# Patient Record
Sex: FEMALE | Race: WHITE | NOT HISPANIC OR LATINO | Employment: FULL TIME | URBAN - METROPOLITAN AREA
[De-identification: names, ages, dates, MRNs, and addresses within clinical notes are randomized per-mention and may not be internally consistent; named-entity substitution may affect disease eponyms.]

---

## 2017-07-17 PROBLEM — Z86.718 HISTORY OF DVT OF LOWER EXTREMITY: Status: ACTIVE | Noted: 2017-07-17

## 2017-07-25 ENCOUNTER — TELEPHONE (OUTPATIENT)
Dept: HEMATOLOGY/ONCOLOGY | Facility: CLINIC | Age: 22
End: 2017-07-25

## 2017-07-25 NOTE — TELEPHONE ENCOUNTER
----- Message from Jossy Denny sent at 7/25/2017  2:46 PM CDT -----  Schedule from the referral.  Please call patient at [phone]

## 2017-07-31 ENCOUNTER — TELEPHONE (OUTPATIENT)
Dept: HEMATOLOGY/ONCOLOGY | Facility: CLINIC | Age: 22
End: 2017-07-31

## 2017-08-09 ENCOUNTER — TELEPHONE (OUTPATIENT)
Dept: HEMATOLOGY/ONCOLOGY | Facility: CLINIC | Age: 22
End: 2017-08-09

## 2017-08-09 NOTE — TELEPHONE ENCOUNTER
Attempted to call patient to schedule consult no answer, unable to leave message due to no voice mail.

## 2017-08-15 ENCOUNTER — TELEPHONE (OUTPATIENT)
Dept: HEMATOLOGY/ONCOLOGY | Facility: CLINIC | Age: 22
End: 2017-08-15

## 2017-08-22 ENCOUNTER — TELEPHONE (OUTPATIENT)
Dept: HEMATOLOGY/ONCOLOGY | Facility: CLINIC | Age: 22
End: 2017-08-22

## 2017-08-22 NOTE — TELEPHONE ENCOUNTER
----- Message from Koby Schrader sent at 8/22/2017 12:27 PM CDT -----  Contact: [phone]  Patient returning the nurse phone call to schedule an appointment. Thanks!

## 2017-09-07 ENCOUNTER — TELEPHONE (OUTPATIENT)
Dept: HEMATOLOGY/ONCOLOGY | Facility: CLINIC | Age: 22
End: 2017-09-07

## 2017-09-11 ENCOUNTER — TELEPHONE (OUTPATIENT)
Dept: HEMATOLOGY/ONCOLOGY | Facility: CLINIC | Age: 22
End: 2017-09-11

## 2017-10-09 ENCOUNTER — OFFICE VISIT (OUTPATIENT)
Dept: HEMATOLOGY/ONCOLOGY | Facility: CLINIC | Age: 22
End: 2017-10-09
Payer: MEDICAID

## 2017-10-09 VITALS
HEIGHT: 64 IN | HEART RATE: 80 BPM | WEIGHT: 225.63 LBS | RESPIRATION RATE: 18 BRPM | DIASTOLIC BLOOD PRESSURE: 80 MMHG | TEMPERATURE: 99 F | SYSTOLIC BLOOD PRESSURE: 133 MMHG | BODY MASS INDEX: 38.52 KG/M2

## 2017-10-09 DIAGNOSIS — N83.202 BILATERAL OVARIAN CYSTS: ICD-10-CM

## 2017-10-09 DIAGNOSIS — Z82.49 FAMILY HISTORY OF BLOOD CLOTS: ICD-10-CM

## 2017-10-09 DIAGNOSIS — N83.201 BILATERAL OVARIAN CYSTS: ICD-10-CM

## 2017-10-09 DIAGNOSIS — I82.531 CHRONIC DEEP VEIN THROMBOSIS (DVT) OF POPLITEAL VEIN OF RIGHT LOWER EXTREMITY: ICD-10-CM

## 2017-10-09 DIAGNOSIS — Z86.718 HISTORY OF DVT OF LOWER EXTREMITY: Primary | ICD-10-CM

## 2017-10-09 DIAGNOSIS — Q50.1 MULTIPLE DEVELOPMENTAL OVARIAN CYSTS: ICD-10-CM

## 2017-10-09 PROCEDURE — 99204 OFFICE O/P NEW MOD 45 MIN: CPT | Mod: ,,, | Performed by: INTERNAL MEDICINE

## 2017-10-09 RX ORDER — MEDROXYPROGESTERONE ACETATE 150 MG/ML
INJECTION, SUSPENSION INTRAMUSCULAR
COMMUNITY
Start: 2017-09-13 | End: 2020-05-27

## 2017-10-09 NOTE — LETTER
October 9, 2017      Beba Austin MD  [address]  [place] [place] [facility] [address]           [facility] Hematology Oncology  [address]  [address]  [place] [address]  Phone: [phone]  Fax: [phone]          Patient: Georgie Dunlap   MR Number: [id number]   YOB: 1995   Date of Visit: 10/9/2017       Dear Dr. Beba Austin:    Thank you for referring Georgie Dunlap to me for evaluation. Attached you will find relevant portions of my assessment and plan of care.    If you have questions, please do not hesitate to call me. I look forward to following Georgie Dunlap along with you.    Sincerely,    CHAR Pierre MD    Enclosure  CC:  No Recipients    If you would like to receive this communication electronically, please contact [email or web address] or [phone] to request more information on "[employer] Link access.    For providers and/or their staff who would like to refer a patient to [facility], please contact us through our one-stop-shop provider referral line, [facility] , at [phone].    If you feel you have received this communication in error or would no longer like to receive these types of communications, please e-mail [email or web address]

## 2017-10-09 NOTE — PROGRESS NOTES
[facility] History & Physical    Subjective:      Patient ID:   Georgie Dunlap  22 y.o. female   [birth year]  Dr. Lester (GYN)          Chief Complaint:   DVT history    HPI:  22 y.o. female with gel and hit her right leg with injury. She developed a right popliteal vein DVT approximately 3 years ago. She was treated with Eliquis ×6 months, and a follow-up ultrasound of the leg at [facility] showed resolution of the DVT.    She has multiple cyst on her ovaries causing her to have pelvic pain. Menses are irregular and heavy. She was given a Depot-progesterone shot on 09/13/2017. Pain symptoms have improved from an 8 out of 10 down to a 3 out of 10.    Other history includes asthma, fibromyalgia, sleep apnea syndrome treated with tonsillectomy at age 18.    She also has history of GERD. 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    She is allergic to sulfa drugs, latex, and plums.    Past history includes the tonsillectomy    Her medications include metformin, the Depo progesterone shot, Zyrtec, Xopenex,  Flovent and Flonase.    Her maternal grandmother and maternal grandfather both had blood clots in the legs. Her mother had kidney failure with each of HER-2 pregnancies and is status post BTL. Her sister has femoral acetabular impingement syndrome of the left and right hip. The patient has not had pregnancies, she does not have history of miscarriages, and she does not have children.    Previous evaluation showed that she was positive for factor V Leiden.    ROS:   GEN: normal without any fever, night sweats or weight loss  HEENT: normal with no HA's, sore throat, stiff neck, changes in vision  CV: normal with no CP, SOB, PND, MARTINEZ or orthopnea  PULM: See HPI. no SOB, cough, hemoptysis, sputum or pleuritic pain  GI: normal with no abdominal pain, nausea, vomiting, constipation, diarrhea,  melanotic stools, BRBPR, or hematemesis  /GYN: See history of present illness   BREAST: normal with no mass, discharge, pain  SKIN: See history of present illness     Past Medical History:   Diagnosis Date    Allergic rhinitis     Asthma     Fibromyalgia     GERD (gastroesophageal reflux disease)      Past Surgical History:   Procedure Laterality Date    TONSILLECTOMY  07/2014       Review of patient's allergies indicates:   Allergen Reactions    Latex, natural rubber     Plum     Sulfa (sulfonamide antibiotics)      Social History     Social History    Marital status: Unknown     Spouse name: N/A    Number of children: N/A    Years of education: N/A     Occupational History    Not on file.     Social History Main Topics    Smoking status: Never Smoker    Smokeless tobacco: Never Used    Alcohol use No    Drug use: No    Sexual activity: Not on file     Other Topics Concern    Not on file     Social History Narrative    No narrative on file         Current Outpatient Prescriptions:     cetirizine (ZYRTEC) 10 MG tablet, Take 10 mg by mouth once daily., Disp: , Rfl:     fluticasone (FLONASE) 50 mcg/actuation nasal spray, 1 spray by Each Nare route once daily., Disp: 16 g, Rfl: 0    fluticasone (FLOVENT HFA) 110 mcg/actuation inhaler, Inhale 1 puff into the lungs 2 (two) times daily. Controller, Disp: 12 g, Rfl: 0    guaifenesin-codeine 100-10 mg/5 ml (TUSSI-ORGANIDIN NR)  mg/5 mL syrup, Take 5 mLs by mouth nightly as needed., Disp: 120 mL, Rfl: 0    ipratropium (ATROVENT) 0.02 % nebulizer solution, Take 2.5 mLs (500 mcg total) by nebulization 4 (four) times daily. Rescue, Disp: 25 mL, Rfl: 0    levalbuterol (XOPENEX HFA) 45 mcg/actuation inhaler, Inhale 1-2 puffs into the lungs every 4 (four) hours as needed for Wheezing. Rescue, Disp: 15 g, Rfl: 2    medroxyPROGESTERone (DEPO-PROVERA) 150 mg/mL injection, , Disp: , Rfl:     metformin (GLUCOPHAGE-XR) 500 MG 24 hr tablet, Take 1  "tablet (500 mg total) by mouth daily with breakfast., Disp: 90 tablet, Rfl: 0    guaifenesin (MUCINEX) 600 mg 12 hr tablet, Take 1 tablet (600 mg total) by mouth 2 (two) times daily., Disp: 60 tablet, Rfl: 2          Objective:   Vitals:  Blood pressure 133/80, pulse 80, temperature 98.6 °F (37 °C), resp. rate 18, height 5' 4" (1.626 m), weight 102.3 kg (225 lb 9.6 oz).       GEN: no apparent distress, comfortable  HEAD: atraumatic and normocephalic  EYES: no pallor, no icterus  ENT: no pharyngeal erythema, external ears WNL; no nasal discharge  NECK: no masses, thyroid normal, trachea midline, no LAD/LN's, supple  CV: RRR with no murmur; normal pulse; normal S1 and S2  CHEST: Normal respiratory effort; CTAB; normal breath sounds; no wheeze or crackles  ABDOM: nontender and nondistended; soft; normal bowel sounds; no rebound/guarding, liver and spleen are not palpable   MUSC/Skeletal: ROM normal; no crepitus; joints normal  EXTREM: Calves are nontender at the left and right, without edema   SKIN: No petechiae, no purpura, no ecchymoses, no rash   : no spivey  NEURO: grossly intact; motor/sensory WNL; no tremors  PSYCH: normal mood, affect and behavior  LYMPH: normal cervical, supraclavicular, axillary and groin LN's  She left the bra all, I did not examine her breasts    Labs:   Lab Results   Component Value Date    WBC 13.1 (H) 07/10/2015    HGB 13.3 07/10/2015    HCT 41.5 07/10/2015    MCV 83.5 07/10/2015     07/10/2015    CMP  Sodium   Date Value Ref Range Status   07/17/2017 139 136 - 145 mmol/L Final   07/10/2015 140 137 - 145 MMOL/L Final     Potassium   Date Value Ref Range Status   07/17/2017 4.3 3.5 - 5.1 mmol/L Final   07/10/2015 4.1 3.5 - 5.1 MMOL/L Final     Chloride   Date Value Ref Range Status   07/17/2017 103 95 - 110 mmol/L Final   07/10/2015 102 98 - 107 MMOL/L Final     CO2   Date Value Ref Range Status   07/17/2017 27 22 - 31 mmol/L Final     Glucose   Date Value Ref Range Status "   07/17/2017 82 70 - 110 mg/dL Final     Comment:     The ADA recommends the following guidelines for fasting glucose:  Normal:       less than 100 mg/dL  Prediabetes:  100 mg/dL to 125 mg/dL  Diabetes:     126 mg/dL or higher       BUN, Bld   Date Value Ref Range Status   07/17/2017 15 7 - 18 mg/dL Final     Creatinine   Date Value Ref Range Status   07/17/2017 0.83 0.50 - 1.40 mg/dL Final   07/10/2015 0.90 0.52 - 1.04 MG/DL Final     Calcium   Date Value Ref Range Status   07/17/2017 9.4 8.4 - 10.2 mg/dL Final     Total Protein   Date Value Ref Range Status   07/17/2017 7.5 6.0 - 8.4 g/dL Final     Albumin   Date Value Ref Range Status   07/17/2017 4.3 3.5 - 5.2 g/dL Final   07/10/2015 4.2 3.5 - 5.0 G/DL Final     Total Bilirubin   Date Value Ref Range Status   07/17/2017 0.4 0.2 - 1.3 mg/dL Final     Comment:     For infants and newborns, interpretation of results should be based  on gestational age, weight and in agreement with clinical  observations.  Premature Infant recommended reference ranges:  Up to 24 hours.............<8.0 mg/dL  Up to 48 hours............<12.0 mg/dL  3-5 days..................<15.0 mg/dL  6-29 days.................<15.0 mg/dL       Alkaline Phosphatase   Date Value Ref Range Status   07/17/2017 77 38 - 145 U/L Final     AST   Date Value Ref Range Status   07/17/2017 25 14 - 36 U/L Final     ALT   Date Value Ref Range Status   07/17/2017 40 10 - 44 U/L Final     Anion Gap   Date Value Ref Range Status   07/17/2017 9 8 - 16 mmol/L Final     eGFR if    Date Value Ref Range Status   07/17/2017 >60 >60 mL/min/1.73 m^2 Final     eGFR if non    Date Value Ref Range Status   07/17/2017 >60 >60 mL/min/1.73 m^2 Final     Comment:     Calculation used to obtain the estimated glomerular filtration  rate (eGFR) is the CKD-EPI equation. Since race is unknown   in our information system, the eGFR values for   -American and Non--American patients are given    for each creatinine result.           Radiology/Diagnostic Studies:    [facility].    Ultrasound from February 2016 was negative for residual DVT.    Assessment:   (1) 22 y.o. female with diagnosis of Left popliteal vein DVT, while on hormone supplement, for the management of multiple ovarian cyst and pain syndrome.    Now on Depo- progesterone injections 09/13/2017. This raises concern for possible future clot events in the setting of her factor V Leiden carrier state.    She has strong family history and her maternal grandmother and grandfather for blood clot events, supporting a familial pattern for blood clots.    I have ordered hypercoagulation syndrome studies through the [facility]. Return to clinic in 3 weeks.    Other history includes multiple ovarian cyst.  Asthma. History of sleep apnea syndrome.      (2)          1. History of DVT of lower extremity    2. Chronic deep vein thrombosis (DVT) of popliteal vein of right lower extremity    3. Bilateral ovarian cysts    4. Family history of blood clots        Plan:       History of DVT of lower extremity    Chronic deep vein thrombosis (DVT) of popliteal vein of right lower extremity    Bilateral ovarian cysts    Family history of blood clots      Return in about 3 weeks (around 10/30/2017) for check blood clot status after therapy..

## 2017-10-11 LAB
LA PPP-IMP: NORMAL
SCREEN APTT: NORMAL S
SCREEN DRVVT: NORMAL S

## 2017-10-14 LAB
APTT PPP: 27 SEC (ref 22–34)
AT III ACT/NOR PPP CHRO: 112 % NORMAL (ref 80–120)
B2 MICROGLOB SERPL-MCNC: 1.86 MG/L
BASOPHILS # BLD AUTO: 26 CELLS/UL (ref 0–200)
BASOPHILS NFR BLD AUTO: 0.3 %
CARDIOLIPIN IGA SER IA-ACNC: <11 APL
CARDIOLIPIN IGG SER IA-ACNC: <14 GPL
CARDIOLIPIN IGM SER IA-ACNC: <12 MPL
DRVVT CONFIRM PPP QL: NEGATIVE
EOSINOPHIL # BLD AUTO: 70 CELLS/UL (ref 15–500)
EOSINOPHIL NFR BLD AUTO: 0.8 %
ERYTHROCYTE [DISTWIDTH] IN BLOOD BY AUTOMATED COUNT: 13.6 % (ref 11–15)
F2 C.20210G>A GENO BLD/T: NORMAL
FACT VIII ACT/NOR PPP: 107 % NORMAL (ref 50–180)
FACT XII ACT/NOR PPP: 126 % NORMAL (ref 50–150)
FIBRINOGEN PPP-MCNC: 357 MG/DL (ref 175–425)
HCT VFR BLD AUTO: 40.8 % (ref 35–45)
HCYS SERPL-SCNC: 8.4 UMOL/L
HGB BLD-MCNC: 13.3 G/DL (ref 11.7–15.5)
INR PPP: 1.1
LA PPP-IMP: NORMAL
LYMPHOCYTES # BLD AUTO: 3654 CELLS/UL (ref 850–3900)
LYMPHOCYTES NFR BLD AUTO: 42 %
MCH RBC QN AUTO: 26.7 PG (ref 27–33)
MCHC RBC AUTO-ENTMCNC: 32.6 G/DL (ref 32–36)
MCV RBC AUTO: 81.8 FL (ref 80–100)
MONOCYTES # BLD AUTO: 409 CELLS/UL (ref 200–950)
MONOCYTES NFR BLD AUTO: 4.7 %
MTHFR GENE MUT ANL BLD/T: NORMAL
NEUTROPHILS # BLD AUTO: 4541 CELLS/UL (ref 1500–7800)
NEUTROPHILS NFR BLD AUTO: 52.2 %
PLATELET # BLD AUTO: 337 THOUSAND/UL (ref 140–400)
PMV BLD REES-ECKER: 10 FL (ref 7.5–12.5)
PROT C ACT/NOR PPP: 105 % NORMAL (ref 70–180)
PROT C AG ACT/NOR PPP IA: NORMAL % NORMAL
PROT S ACT/NOR PPP: 84 % NORMAL (ref 60–140)
PROT S AG ACT/NOR PPP IA: NORMAL % NORMAL
PROTHROMBIN TIME: 11.2 SEC (ref 9–11.5)
RBC # BLD AUTO: 4.99 MILLION/UL (ref 3.8–5.1)
SCREEN APTT: 32 SECONDS
SCREEN DRVVT: 46 SECONDS
SERVICE CMNT-IMP: NORMAL
WBC # BLD AUTO: 8.7 THOUSAND/UL (ref 3.8–10.8)

## 2017-10-31 ENCOUNTER — OFFICE VISIT (OUTPATIENT)
Dept: HEMATOLOGY/ONCOLOGY | Facility: CLINIC | Age: 22
End: 2017-10-31
Payer: MEDICAID

## 2017-10-31 VITALS
HEART RATE: 86 BPM | WEIGHT: 229.38 LBS | HEIGHT: 64 IN | SYSTOLIC BLOOD PRESSURE: 120 MMHG | TEMPERATURE: 98 F | DIASTOLIC BLOOD PRESSURE: 79 MMHG | BODY MASS INDEX: 39.16 KG/M2 | RESPIRATION RATE: 18 BRPM

## 2017-10-31 DIAGNOSIS — J30.9 ALLERGIC RHINITIS, UNSPECIFIED CHRONICITY, UNSPECIFIED SEASONALITY, UNSPECIFIED TRIGGER: ICD-10-CM

## 2017-10-31 DIAGNOSIS — J45.909 UNCOMPLICATED ASTHMA, UNSPECIFIED ASTHMA SEVERITY, UNSPECIFIED WHETHER PERSISTENT: ICD-10-CM

## 2017-10-31 DIAGNOSIS — Q50.1 MULTIPLE DEVELOPMENTAL OVARIAN CYSTS: ICD-10-CM

## 2017-10-31 DIAGNOSIS — K21.9 GASTROESOPHAGEAL REFLUX DISEASE, ESOPHAGITIS PRESENCE NOT SPECIFIED: ICD-10-CM

## 2017-10-31 DIAGNOSIS — D68.51 FACTOR 5 LEIDEN MUTATION, HETEROZYGOUS: ICD-10-CM

## 2017-10-31 DIAGNOSIS — I82.531 CHRONIC DEEP VEIN THROMBOSIS (DVT) OF POPLITEAL VEIN OF RIGHT LOWER EXTREMITY: Primary | ICD-10-CM

## 2017-10-31 PROCEDURE — 99214 OFFICE O/P EST MOD 30 MIN: CPT | Mod: ,,, | Performed by: INTERNAL MEDICINE

## 2017-10-31 NOTE — PROGRESS NOTES
[facility] History & Physical    Subjective:      Patient ID:   Georgie Dunlap  22 y.o. female   [birth year]  Dr. Lester (GYN)          Chief Complaint:   DVT history    HPI:  22 y.o. female who fell  and hit her right leg with injury. She developed a right popliteal vein DVT approximately 3 years ago. She was treated with Eliquis ×6 months, and a follow-up ultrasound of the leg at [facility] showed resolution of the DVT.    She has multiple cyst on her ovaries causing her to have pelvic pain. Menses are irregular and heavy. She was given a Depot-progesterone shot on 09/13/2017. Pain symptoms have improved from an 8 out of 10 down to a 3 out of 10.  She is diagnosed with Polycystic Ovarian Syndrome.    Other history includes asthma, fibromyalgia, sleep apnea syndrome treated with tonsillectomy at age 18.    She also has history of GERD. 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    She is allergic to sulfa drugs, latex, and plums.    Past history includes the tonsillectomy    Her medications include metformin, the Depo progesterone shot, Zyrtec, Xopenex,  Flovent and Flonase.    Her maternal grandmother and maternal grandfather both had blood clots in the legs. Her mother had kidney failure with each of HER-2 pregnancies and is status post BTL. Her sister has femoral acetabular impingement syndrome of the left and right hip. The patient has not had pregnancies, she does not have history of miscarriages, and she does not have children.    Previous evaluation showed that she was positive for factor V Leiden.  Completion of hypercoagulation studies were otherwise negative.    ROS:   GEN: normal without any fever, night sweats or weight loss  HEENT: normal with no HA's, sore throat, stiff neck, changes in vision  CV: normal with no CP, SOB, PND, MARTINEZ or orthopnea  PULM: See HPI. no SOB, cough,  hemoptysis, sputum or pleuritic pain  GI: normal with no abdominal pain, nausea, vomiting, constipation, diarrhea, melanotic stools, BRBPR, or hematemesis  /GYN: See history of present illness   BREAST: normal with no mass, discharge, pain  SKIN: See history of present illness     Past Medical History:   Diagnosis Date    Allergic rhinitis     Asthma     Fibromyalgia     GERD (gastroesophageal reflux disease)      Past Surgical History:   Procedure Laterality Date    TONSILLECTOMY  07/2014       Review of patient's allergies indicates:   Allergen Reactions    Latex, natural rubber     Plum     Sulfa (sulfonamide antibiotics)      Social History     Social History    Marital status: Unknown     Spouse name: N/A    Number of children: N/A    Years of education: N/A     Occupational History    Not on file.     Social History Main Topics    Smoking status: Never Smoker    Smokeless tobacco: Never Used    Alcohol use No    Drug use: No    Sexual activity: Not on file     Other Topics Concern    Not on file     Social History Narrative    No narrative on file         Current Outpatient Prescriptions:     cetirizine (ZYRTEC) 10 MG tablet, Take 10 mg by mouth once daily., Disp: , Rfl:     fluticasone (FLONASE) 50 mcg/actuation nasal spray, 1 spray by Each Nare route once daily., Disp: 16 g, Rfl: 0    fluticasone (FLOVENT HFA) 110 mcg/actuation inhaler, Inhale 1 puff into the lungs 2 (two) times daily. Controller, Disp: 12 g, Rfl: 0    guaifenesin (MUCINEX) 600 mg 12 hr tablet, Take 1 tablet (600 mg total) by mouth 2 (two) times daily., Disp: 60 tablet, Rfl: 2    guaifenesin-codeine 100-10 mg/5 ml (TUSSI-ORGANIDIN NR)  mg/5 mL syrup, Take 5 mLs by mouth nightly as needed., Disp: 120 mL, Rfl: 0    ipratropium (ATROVENT) 0.02 % nebulizer solution, Take 2.5 mLs (500 mcg total) by nebulization 4 (four) times daily. Rescue, Disp: 25 mL, Rfl: 0    levalbuterol (XOPENEX HFA) 45 mcg/actuation  "inhaler, Inhale 1-2 puffs into the lungs every 4 (four) hours as needed for Wheezing. Rescue, Disp: 15 g, Rfl: 2    medroxyPROGESTERone (DEPO-PROVERA) 150 mg/mL injection, , Disp: , Rfl:     metformin (GLUCOPHAGE-XR) 500 MG 24 hr tablet, Take 1 tablet (500 mg total) by mouth daily with breakfast., Disp: 90 tablet, Rfl: 0          Objective:   Vitals:  Blood pressure 120/79, pulse 86, temperature 98.1 °F (36.7 °C), resp. rate 18, height 5' 4" (1.626 m), weight 104.1 kg (229 lb 6.4 oz).       GEN: no apparent distress, comfortable  HEAD: atraumatic and normocephalic  EYES: no pallor, no icterus  ENT: no pharyngeal erythema, external ears WNL; no nasal discharge  NECK: no masses, thyroid normal, trachea midline, no LAD/LN's, supple  CV: RRR with no murmur; normal pulse; normal S1 and S2  CHEST: Normal respiratory effort; CTAB; normal breath sounds; no wheeze or crackles  ABDOM: nontender and nondistended; soft; normal bowel sounds; no rebound/guarding, liver and spleen are not palpable   MUSC/Skeletal: ROM normal; no crepitus; joints normal  EXTREM: Calves are nontender at the left and right, without edema   SKIN: No petechiae, no purpura, no ecchymoses, no rash   : no spivey  NEURO: grossly intact; motor/sensory WNL; no tremors  PSYCH: normal mood, affect and behavior  LYMPH: normal cervical, supraclavicular, axillary and groin LN's  She left the bra all, I did not examine her breasts    Labs:   Lab Results   Component Value Date    WBC 8.7 10/10/2017    HGB 13.3 10/10/2017    HCT 40.8 10/10/2017    MCV 81.8 10/10/2017     10/10/2017    CMP  Sodium   Date Value Ref Range Status   07/17/2017 139 136 - 145 mmol/L Final   07/10/2015 140 137 - 145 MMOL/L Final     Potassium   Date Value Ref Range Status   07/17/2017 4.3 3.5 - 5.1 mmol/L Final   07/10/2015 4.1 3.5 - 5.1 MMOL/L Final     Chloride   Date Value Ref Range Status   07/17/2017 103 95 - 110 mmol/L Final   07/10/2015 102 98 - 107 MMOL/L Final     CO2 "   Date Value Ref Range Status   07/17/2017 27 22 - 31 mmol/L Final     Glucose   Date Value Ref Range Status   07/17/2017 82 70 - 110 mg/dL Final     Comment:     The ADA recommends the following guidelines for fasting glucose:  Normal:       less than 100 mg/dL  Prediabetes:  100 mg/dL to 125 mg/dL  Diabetes:     126 mg/dL or higher       BUN, Bld   Date Value Ref Range Status   07/17/2017 15 7 - 18 mg/dL Final     Creatinine   Date Value Ref Range Status   07/17/2017 0.83 0.50 - 1.40 mg/dL Final   07/10/2015 0.90 0.52 - 1.04 MG/DL Final     Calcium   Date Value Ref Range Status   07/17/2017 9.4 8.4 - 10.2 mg/dL Final     Total Protein   Date Value Ref Range Status   07/17/2017 7.5 6.0 - 8.4 g/dL Final     Albumin   Date Value Ref Range Status   07/17/2017 4.3 3.5 - 5.2 g/dL Final   07/10/2015 4.2 3.5 - 5.0 G/DL Final     Total Bilirubin   Date Value Ref Range Status   07/17/2017 0.4 0.2 - 1.3 mg/dL Final     Comment:     For infants and newborns, interpretation of results should be based  on gestational age, weight and in agreement with clinical  observations.  Premature Infant recommended reference ranges:  Up to 24 hours.............<8.0 mg/dL  Up to 48 hours............<12.0 mg/dL  3-5 days..................<15.0 mg/dL  6-29 days.................<15.0 mg/dL       Alkaline Phosphatase   Date Value Ref Range Status   07/17/2017 77 38 - 145 U/L Final     AST   Date Value Ref Range Status   07/17/2017 25 14 - 36 U/L Final     ALT   Date Value Ref Range Status   07/17/2017 40 10 - 44 U/L Final     Anion Gap   Date Value Ref Range Status   07/17/2017 9 8 - 16 mmol/L Final     eGFR if    Date Value Ref Range Status   07/17/2017 >60 >60 mL/min/1.73 m^2 Final     eGFR if non    Date Value Ref Range Status   07/17/2017 >60 >60 mL/min/1.73 m^2 Final     Comment:     Calculation used to obtain the estimated glomerular filtration  rate (eGFR) is the CKD-EPI equation. Since race is unknown    in our information system, the eGFR values for   -American and Non--American patients are given   for each creatinine result.           Radiology/Diagnostic Studies:    Ultrasound from February 2016 was negative for residual DVT.      Assessment:   (1) 22 y.o. female with diagnosis of Left popliteal vein DVT, while on hormone supplement, for the management of multiple ovarian cyst and pain syndrome.    Now on Depo- progesterone injections 09/13/2017. This raises concern for possible future clot events in the setting of her factor V Leiden carrier state.    She has strong family history and her maternal grandmother and grandfather for blood clot events, supporting a familial pattern for blood clots.    Estimated risk of clot event on depo-progesterone and with F5L is 1-2% per year of use.  Probably an acceptable risk given the degree of sx control with depo-progesterone and her Polycystic Ovarian Syndrome.    Begin ASA 81 mg prophylaxis daily.    Asthma. History of sleep apnea syndrome.      RTC in 6 months.

## 2017-10-31 NOTE — LETTER
October 31, 2017      Beba Austin MD  [address]  [place] [address]           [facility] Hematology Oncology  [address]  [address]  [place] [address]  Phone: [phone]  Fax: [phone]          Patient: Georgie Dunlap   MR Number: [id number]   YOB: 1995   Date of Visit: 10/31/2017       Dear Dr. Beba Austin:    Thank you for referring Georgie Dunlap to me for evaluation. Attached you will find relevant portions of my assessment and plan of care.    If you have questions, please do not hesitate to call me. I look forward to following Georgie Dunlap along with you.    Sincerely,    CHAR Pierre MD    Enclosure  CC:  No Recipients    If you would like to receive this communication electronically, please contact [email or web address] or [phone] to request more information on [employer] Link access.    For providers and/or their staff who would like to refer a patient to [facility], please contact us through our one-stop-shop provider referral line, [facility] , at [phone].    If you feel you have received this communication in error or would no longer like to receive these types of communications, please e-mail [email or web address]

## 2017-11-21 PROBLEM — E28.2 PCOS (POLYCYSTIC OVARIAN SYNDROME): Status: ACTIVE | Noted: 2017-11-21

## 2018-05-14 ENCOUNTER — OFFICE VISIT (OUTPATIENT)
Dept: HEMATOLOGY/ONCOLOGY | Facility: CLINIC | Age: 23
End: 2018-05-14
Payer: MEDICAID

## 2018-05-14 VITALS
DIASTOLIC BLOOD PRESSURE: 64 MMHG | HEIGHT: 64 IN | TEMPERATURE: 99 F | WEIGHT: 240.5 LBS | HEART RATE: 89 BPM | BODY MASS INDEX: 41.06 KG/M2 | SYSTOLIC BLOOD PRESSURE: 114 MMHG

## 2018-05-14 DIAGNOSIS — D68.51 FACTOR 5 LEIDEN MUTATION, HETEROZYGOUS: ICD-10-CM

## 2018-05-14 DIAGNOSIS — E28.2 PCOS (POLYCYSTIC OVARIAN SYNDROME): ICD-10-CM

## 2018-05-14 DIAGNOSIS — I82.531 CHRONIC DEEP VEIN THROMBOSIS (DVT) OF POPLITEAL VEIN OF RIGHT LOWER EXTREMITY: Primary | ICD-10-CM

## 2018-05-14 PROCEDURE — 99214 OFFICE O/P EST MOD 30 MIN: CPT | Mod: ,,, | Performed by: INTERNAL MEDICINE

## 2018-05-14 NOTE — PROGRESS NOTES
[facility] History & Physical    Subjective:      Patient ID:   Georgie Dunlap  22 y.o. female   [birth year]  Irvin Lester (GYN)  Beba Harrington NP        Chief Complaint:   DVT history    HPI:  22 y.o. female who fell  and hit her right leg with injury. She developed a right popliteal vein DVT approximately 3 years ago. She was treated with Eliquis ×6 months, and a follow-up ultrasound of the leg at [facility] showed resolution of the DVT.    She has multiple cyst on her ovaries causing her to have pelvic pain. Menses are irregular and heavy. She was given a Depot-progesterone shot on 09/13/2017. Pain symptoms have improved from an 8 out of 10 down to a 3 out of 10.  She is diagnosed with Polycystic Ovarian Syndrome.    Other history includes asthma, fibromyalgia, sleep apnea syndrome treated with tonsillectomy at age 18.    She also has history of GERD. 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    She is allergic to sulfa drugs, latex, and plums.    Past history includes the tonsillectomy    Her medications include metformin, the Depo progesterone shot q 3 months, Zyrtec, Xopenex,  Flovent and Flonase.  She plans to be on the Depot x's 4-5 years.  Anticipate 1-2% risk of clot event yearly with the depot birth control med.    She admits to decrease in pelvic pain, on the Depot med.  She is on ASA 81mg daily.    Started a new job, .    Her maternal grandmother and maternal grandfather both had blood clots in the legs. Her mother had kidney failure with each of HER-2 pregnancies and is status post BTL. Her sister has femoral acetabular impingement syndrome of the left and right hip. The patient has not had pregnancies, she does not have history of miscarriages, and she does not have children.    Previous evaluation showed that she was positive for factor V Leiden.   Completion of hypercoagulation studies were otherwise negative.    ROS:   GEN: normal without any fever, night sweats or weight loss  HEENT: normal with no HA's, sore throat, stiff neck, changes in vision  CV: normal with no CP, SOB, PND, MARTINEZ or orthopnea  PULM: See HPI. no SOB, cough, hemoptysis, sputum or pleuritic pain  GI: normal with no abdominal pain, nausea, vomiting, constipation, diarrhea, melanotic stools, BRBPR, or hematemesis  /GYN: See history of present illness   BREAST: normal with no mass, discharge, pain  SKIN: See history of present illness     Past Medical History:   Diagnosis Date    Allergic rhinitis     Asthma     Fibromyalgia     GERD (gastroesophageal reflux disease)      Past Surgical History:   Procedure Laterality Date    TONSILLECTOMY  07/2014       Review of patient's allergies indicates:   Allergen Reactions    Latex, natural rubber     Plum     Sulfa (sulfonamide antibiotics)      Social History     Social History    Marital status: Unknown     Spouse name: N/A    Number of children: N/A    Years of education: N/A     Occupational History    Not on file.     Social History Main Topics    Smoking status: Never Smoker    Smokeless tobacco: Never Used    Alcohol use No    Drug use: No    Sexual activity: Not on file     Other Topics Concern    Not on file     Social History Narrative    No narrative on file         Current Outpatient Prescriptions:     albuterol (ACCUNEB) 1.25 mg/3 mL Nebu, Take 3 mLs (1.25 mg total) by nebulization every 6 (six) hours as needed. Rescue, Disp: 1 Box, Rfl: 2    albuterol 90 mcg/actuation inhaler, Inhale 2 puffs into the lungs every 6 (six) hours as needed for Wheezing. Rescue, Disp: 18 g, Rfl: 2    cetirizine (ZYRTEC) 10 MG tablet, Take 10 mg by mouth once daily., Disp: , Rfl:     clonazePAM (KLONOPIN) 0.5 MG tablet, Take 1 tablet (0.5 mg total) by mouth every evening., Disp: 30 tablet, Rfl: 1    fluticasone (FLONASE) 50  mcg/actuation nasal spray, 1 spray by Each Nare route once daily., Disp: 16 g, Rfl: 0    fluticasone (FLOVENT HFA) 110 mcg/actuation inhaler, Inhale 1 puff into the lungs 2 (two) times daily. Controller, Disp: 12 g, Rfl: 0    hydroCHLOROthiazide (HYDRODIURIL) 25 MG tablet, Take 1 tablet (25 mg total) by mouth once daily., Disp: 30 tablet, Rfl: 2    ipratropium (ATROVENT) 0.02 % nebulizer solution, Take 2.5 mLs (500 mcg total) by nebulization 4 (four) times daily. Rescue, Disp: 25 mL, Rfl: 0    levalbuterol (XOPENEX HFA) 45 mcg/actuation inhaler, Inhale 1-2 puffs into the lungs every 4 (four) hours as needed for Wheezing. Rescue, Disp: 15 g, Rfl: 2    medroxyPROGESTERone (DEPO-PROVERA) 150 mg/mL injection, , Disp: , Rfl:     meloxicam (MOBIC) 15 MG tablet, Take 1 tablet (15 mg total) by mouth once daily., Disp: 30 tablet, Rfl: 0    metFORMIN (GLUCOPHAGE-XR) 500 MG 24 hr tablet, Take 1 tablet (500 mg total) by mouth daily with breakfast., Disp: 90 tablet, Rfl: 0    ondansetron (ZOFRAN-ODT) 8 MG TbDL, Take 1 tablet (8 mg total) by mouth every 6 (six) hours as needed., Disp: 6 tablet, Rfl: 0    sumatriptan (IMITREX) 25 MG Tab, 25-50mg q 2 hrs prn (do not exceed 200mg/ 24hrs), Disp: 9 tablet, Rfl: 1    traZODone (DESYREL) 50 MG tablet, Take 1 tablet (50 mg total) by mouth every evening., Disp: 30 tablet, Rfl: 1          Objective:   Vitals:  There were no vitals taken for this visit.       GEN: no apparent distress, comfortable  HEAD: atraumatic and normocephalic  EYES: no pallor, no icterus  ENT: no pharyngeal erythema, external ears WNL; no nasal discharge  NECK: no masses, thyroid normal, trachea midline, no LAD/LN's, supple  CV: RRR with no murmur; normal pulse; normal S1 and S2  CHEST: Normal respiratory effort; CTAB; normal breath sounds; no wheeze or crackles  ABDOM: nontender and nondistended; soft; normal bowel sounds; no rebound/guarding, liver and spleen are not palpable   MUSC/Skeletal: ROM normal;  no crepitus; joints normal  EXTREM: Calves are nontender at the left and right, without edema   SKIN: No petechiae, no purpura, no ecchymoses, no rash   : no spivey  NEURO: grossly intact; motor/sensory WNL; no tremors  PSYCH: normal mood, affect and behavior  LYMPH: normal cervical, supraclavicular, axillary and groin LN's  She left the bra all, I did not examine her breasts    Labs:   Lab Results   Component Value Date    WBC 8.7 10/10/2017    HGB 13.3 10/10/2017    HCT 40.8 10/10/2017    MCV 81.8 10/10/2017     10/10/2017    CMP  Sodium   Date Value Ref Range Status   03/23/2018 143 136 - 145 mmol/L Final   07/10/2015 140 137 - 145 MMOL/L Final     Potassium   Date Value Ref Range Status   03/23/2018 4.4 3.5 - 5.1 mmol/L Final   07/10/2015 4.1 3.5 - 5.1 MMOL/L Final     Chloride   Date Value Ref Range Status   03/23/2018 108 95 - 110 mmol/L Final   07/10/2015 102 98 - 107 MMOL/L Final     CO2   Date Value Ref Range Status   03/23/2018 25 22 - 31 mmol/L Final     Glucose   Date Value Ref Range Status   03/23/2018 89 70 - 110 mg/dL Final     Comment:     The ADA recommends the following guidelines for fasting glucose:  Normal:       less than 100 mg/dL  Prediabetes:  100 mg/dL to 125 mg/dL  Diabetes:     126 mg/dL or higher       BUN, Bld   Date Value Ref Range Status   03/23/2018 18 7 - 18 mg/dL Final     Creatinine   Date Value Ref Range Status   03/23/2018 0.78 0.50 - 1.40 mg/dL Final   07/10/2015 0.90 0.52 - 1.04 MG/DL Final     Calcium   Date Value Ref Range Status   03/23/2018 9.2 8.4 - 10.2 mg/dL Final     Total Protein   Date Value Ref Range Status   03/23/2018 6.6 6.0 - 8.4 g/dL Final     Albumin   Date Value Ref Range Status   03/23/2018 3.9 3.5 - 5.2 g/dL Final   07/10/2015 4.2 3.5 - 5.0 G/DL Final     Total Bilirubin   Date Value Ref Range Status   03/23/2018 0.3 0.2 - 1.3 mg/dL Final     Alkaline Phosphatase   Date Value Ref Range Status   03/23/2018 69 38 - 145 U/L Final     AST   Date Value  Ref Range Status   03/23/2018 23 14 - 36 U/L Final     ALT   Date Value Ref Range Status   03/23/2018 40 10 - 44 U/L Final     Anion Gap   Date Value Ref Range Status   03/23/2018 10 8 - 16 mmol/L Final     eGFR if    Date Value Ref Range Status   03/23/2018 >60 >60 mL/min/1.73 m^2 Final     eGFR if non    Date Value Ref Range Status   03/23/2018 >60 >60 mL/min/1.73 m^2 Final     Comment:     Calculation used to obtain the estimated glomerular filtration  rate (eGFR) is the CKD-EPI equation.            Radiology/Diagnostic Studies:    Ultrasound from February 2016 was negative for residual DVT.      Assessment:   (1) 22 y.o. female with diagnosis of Left popliteal vein DVT, while on hormone supplement, for the management of multiple ovarian cyst and pain syndrome.    Now on Depo- progesterone injections 09/13/2017. This raises concern for possible future clot events in the setting of her factor V Leiden carrier state.    She has strong family history and her maternal grandmother and grandfather for blood clot events, supporting a familial pattern for blood clots.    Estimated risk of clot event on depo-progesterone and with F5L is 1-2% per year of use.  Probably an acceptable risk given the degree of sx control with depo-progesterone and her Polycystic Ovarian Syndrome.    Begin ASA 81 mg prophylaxis daily.    Asthma. History of sleep apnea syndrome.      RTC in 6 months.

## 2018-05-14 NOTE — LETTER
May 14, 2018      Beba Austin MD  [address]  [place] [address]           Critical access hospital Hematology Oncology  [address]  [address]  [place] [facility] [address]  Phone: [phone]  Fax: [phone]          Patient: Georgie Dunlap   MR Number: [id number]   YOB: 1995   Date of Visit: 5/14/2018       Dear Dr. Beba Austin:    Thank you for referring Georgie Dunlap to me for evaluation. Attached you will find relevant portions of my assessment and plan of care.    If you have questions, please do not hesitate to call me. I look forward to following Georgie Dunlap along with you.    Sincerely,    CHAR Pierre MD    Enclosure  CC:  No Recipients    If you would like to receive this communication electronically, please contact [email or web address] or [phone] to request more information on [employer] Link access.    For providers and/or their staff who would like to refer a patient to [facility], please contact us through our one-stop-shop provider referral line, [facility] , at [phone].    If you feel you have received this communication in error or would no longer like to receive these types of communications, please e-mail [email or web address]

## 2019-01-09 ENCOUNTER — OFFICE VISIT (OUTPATIENT)
Dept: HEMATOLOGY/ONCOLOGY | Facility: CLINIC | Age: 24
End: 2019-01-09
Payer: MEDICAID

## 2019-01-09 VITALS
HEART RATE: 101 BPM | BODY MASS INDEX: 43.38 KG/M2 | RESPIRATION RATE: 20 BRPM | SYSTOLIC BLOOD PRESSURE: 111 MMHG | WEIGHT: 252.69 LBS | TEMPERATURE: 98 F | DIASTOLIC BLOOD PRESSURE: 75 MMHG

## 2019-01-09 DIAGNOSIS — I82.531 CHRONIC DEEP VEIN THROMBOSIS (DVT) OF POPLITEAL VEIN OF RIGHT LOWER EXTREMITY: Primary | ICD-10-CM

## 2019-01-09 DIAGNOSIS — M79.89 PAIN AND SWELLING OF RIGHT LOWER LEG: ICD-10-CM

## 2019-01-09 DIAGNOSIS — M79.661 PAIN AND SWELLING OF RIGHT LOWER LEG: ICD-10-CM

## 2019-01-09 PROCEDURE — 99214 OFFICE O/P EST MOD 30 MIN: CPT | Mod: ,,, | Performed by: INTERNAL MEDICINE

## 2019-01-09 PROCEDURE — 99214 PR OFFICE/OUTPT VISIT, EST, LEVL IV, 30-39 MIN: ICD-10-PCS | Mod: ,,, | Performed by: INTERNAL MEDICINE

## 2019-01-13 ENCOUNTER — TELEPHONE (OUTPATIENT)
Dept: HEMATOLOGY/ONCOLOGY | Facility: CLINIC | Age: 24
End: 2019-01-13

## 2019-01-14 ENCOUNTER — TELEPHONE (OUTPATIENT)
Dept: HEMATOLOGY/ONCOLOGY | Facility: CLINIC | Age: 24
End: 2019-01-14

## 2019-01-15 NOTE — PROGRESS NOTES
[facility] History & Physical    Subjective:      Patient ID:   Georgie Dunlap  23 y.o. female   [birth year]  Irvin Lester (GYN)  Beba Harrington NP        Chief Complaint:   DVT history    HPI:  23 y.o. female who fell  and hit her right leg with injury. She developed a right popliteal vein DVT approximately 3 years ago. She was treated with Eliquis ×6 months, and a follow-up ultrasound of the leg at [facility] showed resolution of the DVT.    She has multiple cyst on her ovaries causing her to have pelvic pain. Menses are irregular and heavy. She was given a Depot-progesterone shot on 09/13/2017. Pain symptoms have improved from an 8 out of 10 down to a 3 out of 10.  She is diagnosed with Polycystic Ovarian Syndrome.    Other history includes asthma, fibromyalgia, sleep apnea syndrome treated with tonsillectomy at age 18.    She also has history of GERD. 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    She is allergic to sulfa drugs, latex, and plums.    Past history includes the tonsillectomy    Her medications include metformin, the Depo progesterone shot q 3 months, Zyrtec, Xopenex,  Flovent and Flonase.  She plans to be on the Depot x's 4-5 years.  Anticipate 1-2% risk of clot event yearly with the depot birth control med.    She admits to decrease in pelvic pain, on the Depot med.  She is on ASA 81mg daily.    Started a new job, .    Her maternal grandmother and maternal grandfather both had blood clots in the legs. Her mother had kidney failure with each of HER-2 pregnancies and is status post BTL. Her sister has femoral acetabular impingement syndrome of the left and right hip. The patient has not had pregnancies, she does not have history of miscarriages, and she does not have children.    Previous evaluation showed that she was positive for factor V Leiden.   Completion of hypercoagulation studies were otherwise negative.    On 12/25/18, she had calf pain and rested her leg,  Sx resolved.    ROS:   GEN: normal without any fever, night sweats or weight loss  HEENT: normal with no HA's, sore throat, stiff neck, changes in vision  CV: normal with no CP, SOB, PND, MARTINEZ or orthopnea  PULM: See HPI. no SOB, cough, hemoptysis, sputum or pleuritic pain  GI: normal with no abdominal pain, nausea, vomiting, constipation, diarrhea, melanotic stools, BRBPR, or hematemesis  /GYN: See history of present illness   BREAST: normal with no mass, discharge, pain  SKIN: See history of present illness     Past Medical History:   Diagnosis Date    Allergic rhinitis     Asthma     Fibromyalgia     GERD (gastroesophageal reflux disease)      Past Surgical History:   Procedure Laterality Date    TONSILLECTOMY  07/2014       Review of patient's allergies indicates:   Allergen Reactions    Latex, natural rubber     Plum     Sulfa (sulfonamide antibiotics)      Social History     Socioeconomic History    Marital status: Unknown     Spouse name: Not on file    Number of children: Not on file    Years of education: Not on file    Highest education level: Not on file   Social Needs    Financial resource strain: Not on file    Food insecurity - worry: Not on file    Food insecurity - inability: Not on file    Transportation needs - medical: Not on file    Transportation needs - non-medical: Not on file   Occupational History    Not on file   Tobacco Use    Smoking status: Never Smoker    Smokeless tobacco: Never Used   Substance and Sexual Activity    Alcohol use: No    Drug use: No    Sexual activity: Yes   Other Topics Concern    Not on file   Social History Narrative    Not on file         Current Outpatient Medications:     albuterol (ACCUNEB) 1.25 mg/3 mL Nebu, Take 3 mLs (1.25 mg total) by nebulization every 6 (six) hours as needed. Rescue, Disp: 1 Box, Rfl: 2     albuterol 90 mcg/actuation inhaler, Inhale 2 puffs into the lungs every 6 (six) hours as needed for Wheezing. Rescue, Disp: 18 g, Rfl: 2    cetirizine (ZYRTEC) 10 MG tablet, Take 10 mg by mouth once daily., Disp: , Rfl:     fluticasone (FLONASE) 50 mcg/actuation nasal spray, 1 spray by Each Nare route once daily., Disp: 16 g, Rfl: 0    hydroCHLOROthiazide (HYDRODIURIL) 25 MG tablet, Take 1 tablet (25 mg total) by mouth once daily., Disp: 30 tablet, Rfl: 2    medroxyPROGESTERone (DEPO-PROVERA) 150 mg/mL injection, , Disp: , Rfl:     metFORMIN (GLUCOPHAGE-XR) 500 MG 24 hr tablet, Take 1 tablet (500 mg total) by mouth daily with breakfast., Disp: 90 tablet, Rfl: 0    nabumetone (RELAFEN) 750 MG tablet, Take 1 tablet (750 mg total) by mouth 2 (two) times daily., Disp: 60 tablet, Rfl: 2    ondansetron (ZOFRAN-ODT) 8 MG TbDL, Take 1 tablet (8 mg total) by mouth every 12 (twelve) hours as needed., Disp: 6 tablet, Rfl: 0    sumatriptan (IMITREX) 25 MG Tab, 25-50mg q 2 hrs prn (do not exceed 200mg/ 24hrs), Disp: 9 tablet, Rfl: 1    topiramate (TOPAMAX) 25 MG tablet, 25mg  Nightly x 1 week then increase to 25mg 2x/day x 1 weeks, then 25mg in am 50mg at night x 1 week, then 50mg 2x/day, Disp: 70 tablet, Rfl: 0    traZODone (DESYREL) 100 MG tablet, Take 1 tablet (100 mg total) by mouth every evening., Disp: 30 tablet, Rfl: 1    ipratropium (ATROVENT) 0.02 % nebulizer solution, Take 2.5 mLs (500 mcg total) by nebulization 4 (four) times daily. Rescue, Disp: 25 mL, Rfl: 0          Objective:   Vitals:  Blood pressure 111/75, pulse 101, temperature 98.4 °F (36.9 °C), resp. rate 20, weight 114.6 kg (252 lb 11.2 oz).       GEN: no apparent distress, comfortable  HEAD: atraumatic and normocephalic  EYES: no pallor, no icterus  ENT: no pharyngeal erythema, external ears WNL; no nasal discharge  NECK: no masses, thyroid normal, trachea midline, no LAD/LN's, supple  CV: RRR with no murmur; normal pulse; normal S1 and  S2  CHEST: Normal respiratory effort; CTAB; normal breath sounds; no wheeze or crackles  ABDOM: nontender and nondistended; soft; normal bowel sounds; no rebound/guarding, liver and spleen are not palpable   MUSC/Skeletal: ROM normal; no crepitus; joints normal  EXTREM: Calves are nontender at the left and right, trace edema noted.  SKIN: No petechiae, no purpura, no ecchymoses, no rash   : no spivey  NEURO: grossly intact; motor/sensory WNL; no tremors  PSYCH: normal mood, affect and behavior  LYMPH: normal cervical, supraclavicular, axillary and groin LN's  She left the bra all, I did not examine her breasts    Labs:   Lab Results   Component Value Date    WBC 8.7 10/10/2017    HGB 13.3 10/10/2017    HCT 40.8 10/10/2017    MCV 81.8 10/10/2017     10/10/2017    CMP  Sodium   Date Value Ref Range Status   09/12/2018 140 136 - 145 mmol/L Final   07/10/2015 140 137 - 145 MMOL/L Final     Potassium   Date Value Ref Range Status   09/12/2018 4.2 3.5 - 5.1 mmol/L Final   07/10/2015 4.1 3.5 - 5.1 MMOL/L Final     Chloride   Date Value Ref Range Status   09/12/2018 107 95 - 110 mmol/L Final   07/10/2015 102 98 - 107 MMOL/L Final     CO2   Date Value Ref Range Status   09/12/2018 23 22 - 31 mmol/L Final     Glucose   Date Value Ref Range Status   09/12/2018 91 70 - 110 mg/dL Final     Comment:     The ADA recommends the following guidelines for fasting glucose:  Normal:       less than 100 mg/dL  Prediabetes:  100 mg/dL to 125 mg/dL  Diabetes:     126 mg/dL or higher       BUN, Bld   Date Value Ref Range Status   09/12/2018 20 (H) 7 - 18 mg/dL Final     Creatinine   Date Value Ref Range Status   09/12/2018 0.97 0.50 - 1.40 mg/dL Final   07/10/2015 0.90 0.52 - 1.04 MG/DL Final     Calcium   Date Value Ref Range Status   09/12/2018 9.4 8.4 - 10.2 mg/dL Final     Total Protein   Date Value Ref Range Status   03/23/2018 6.6 6.0 - 8.4 g/dL Final     Albumin   Date Value Ref Range Status   03/23/2018 3.9 3.5 - 5.2 g/dL  Final   07/10/2015 4.2 3.5 - 5.0 G/DL Final     Total Bilirubin   Date Value Ref Range Status   03/23/2018 0.3 0.2 - 1.3 mg/dL Final     Alkaline Phosphatase   Date Value Ref Range Status   03/23/2018 69 38 - 145 U/L Final     AST   Date Value Ref Range Status   03/23/2018 23 14 - 36 U/L Final     ALT   Date Value Ref Range Status   03/23/2018 40 10 - 44 U/L Final     Anion Gap   Date Value Ref Range Status   09/12/2018 10 8 - 16 mmol/L Final     eGFR if    Date Value Ref Range Status   09/12/2018 >60 >60 mL/min/1.73 m^2 Final     eGFR if non    Date Value Ref Range Status   09/12/2018 >60 >60 mL/min/1.73 m^2 Final     Comment:     Calculation used to obtain the estimated glomerular filtration  rate (eGFR) is the CKD-EPI equation.            Radiology/Diagnostic Studies:    Ultrasound from February 2016 was negative for residual DVT.      Assessment:   (1) 23 y.o. female with diagnosis of Left popliteal vein DVT, while on hormone supplement, for the management of multiple ovarian cyst and pain syndrome.    Now on Depo- progesterone injections 09/13/2017. This raises concern for possible future clot events in the setting of her factor V Leiden carrier state.    She has strong family history and her maternal grandmother and grandfather for blood clot events, supporting a familial pattern for blood clots.    Estimated risk of clot event on depo-progesterone and with F5L is 1-2% per year of use.  Probably an acceptable risk given the degree of sx control with depo-progesterone and her Polycystic Ovarian Syndrome.    On  ASA 81 mg prophylaxis daily.    Check U/S of leg for possible DVT?    Asthma. History of sleep apnea syndrome.      RTC in 6 months.

## 2019-07-09 ENCOUNTER — OFFICE VISIT (OUTPATIENT)
Dept: HEMATOLOGY/ONCOLOGY | Facility: CLINIC | Age: 24
End: 2019-07-09
Payer: MEDICAID

## 2019-07-09 VITALS
HEART RATE: 102 BPM | WEIGHT: 270 LBS | RESPIRATION RATE: 20 BRPM | SYSTOLIC BLOOD PRESSURE: 115 MMHG | DIASTOLIC BLOOD PRESSURE: 82 MMHG | TEMPERATURE: 99 F | BODY MASS INDEX: 46.35 KG/M2

## 2019-07-09 DIAGNOSIS — D68.51 FACTOR 5 LEIDEN MUTATION, HETEROZYGOUS: ICD-10-CM

## 2019-07-09 DIAGNOSIS — I82.531 CHRONIC DEEP VEIN THROMBOSIS (DVT) OF POPLITEAL VEIN OF RIGHT LOWER EXTREMITY: Primary | ICD-10-CM

## 2019-07-09 DIAGNOSIS — E28.2 PCOS (POLYCYSTIC OVARIAN SYNDROME): ICD-10-CM

## 2019-07-09 PROCEDURE — 99214 OFFICE O/P EST MOD 30 MIN: CPT | Mod: ,,, | Performed by: INTERNAL MEDICINE

## 2019-07-09 PROCEDURE — 99214 PR OFFICE/OUTPT VISIT, EST, LEVL IV, 30-39 MIN: ICD-10-PCS | Mod: ,,, | Performed by: INTERNAL MEDICINE

## 2019-07-10 NOTE — PROGRESS NOTES
[facility] History & Physical    Subjective:      Patient ID:   Georgie Dunlap  23 y.o. female   [birth year]  Irvin Lester (GYN)  Beba Harrington NP        Chief Complaint:   DVT history    HPI:  23 y.o. female who fell  and hit her right leg with injury. She developed a right popliteal vein DVT approximately 3 years ago. She was treated with Eliquis ×6 months, and a follow-up ultrasound of the leg at [facility] showed resolution of the DVT.    She has multiple cyst on her ovaries causing her to have pelvic pain. Menses are irregular and heavy. She was given a Depot-progesterone shot on 09/13/2017. Pain symptoms have improved from an 8 out of 10 down to a 3 out of 10.  She is diagnosed with Polycystic Ovarian Syndrome.    She has not had further clot event, even on Depot Rx as above.  She has purchased a home.  Ovarian cysts are felt to have regressed on  Depot Rx.      Other history includes asthma, fibromyalgia, sleep apnea syndrome treated with tonsillectomy at age 18.    She also has history of GERD. 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    She is allergic to sulfa drugs, latex, and plums.    Past history includes the tonsillectomy    Her medications include metformin, the Depo progesterone shot q 3 months, Zyrtec, Xopenex,  Flovent and Flonase.  She plans to be on the Depot x's 4-5 years.  Anticipate 1-2% risk of clot event yearly with the depot birth control med.    She admits to decrease in pelvic pain, on the Depot med.  She is on ASA 81mg daily.    Started a new job, .    Her maternal grandmother and maternal grandfather both had blood clots in the legs. Her mother had kidney failure with each of HER-2 pregnancies and is status post BTL. Her sister has femoral acetabular impingement syndrome of the left and right hip. The patient has not had pregnancies, she  does not have history of miscarriages, and she does not have children.    Previous evaluation showed that she was positive for factor V Leiden.  Completion of hypercoagulation studies were otherwise negative.    On 12/25/18, she had calf pain and rested her leg,  Sx resolved.    ROS:   GEN: normal without any fever, night sweats or weight loss  HEENT: normal with no HA's, sore throat, stiff neck, changes in vision  CV: normal with no CP, SOB, PND, MARTINEZ or orthopnea  PULM: See HPI. no SOB, cough, hemoptysis, sputum or pleuritic pain  GI: normal with no abdominal pain, nausea, vomiting, constipation, diarrhea, melanotic stools, BRBPR, or hematemesis  /GYN: See history of present illness   BREAST: normal with no mass, discharge, pain  SKIN: See history of present illness     Past Medical History:   Diagnosis Date    Allergic rhinitis     Asthma     Fibromyalgia     GERD (gastroesophageal reflux disease)      Past Surgical History:   Procedure Laterality Date    TONSILLECTOMY  07/2014       Review of patient's allergies indicates:   Allergen Reactions    Latex, natural rubber     Plum     Sulfa (sulfonamide antibiotics)      Social History     Socioeconomic History    Marital status: Unknown     Spouse name: Not on file    Number of children: Not on file    Years of education: Not on file    Highest education level: Not on file   Occupational History    Not on file   Social Needs    Financial resource strain: Not on file    Food insecurity:     Worry: Not on file     Inability: Not on file    Transportation needs:     Medical: Not on file     Non-medical: Not on file   Tobacco Use    Smoking status: Never Smoker    Smokeless tobacco: Never Used   Substance and Sexual Activity    Alcohol use: No    Drug use: No    Sexual activity: Yes   Lifestyle    Physical activity:     Days per week: Not on file     Minutes per session: Not on file    Stress: Not on file   Relationships    Social connections:      Talks on phone: Not on file     Gets together: Not on file     Attends Evangelical service: Not on file     Active member of club or organization: Not on file     Attends meetings of clubs or organizations: Not on file     Relationship status: Not on file   Other Topics Concern    Not on file   Social History Narrative    Not on file         Current Outpatient Medications:     albuterol 90 mcg/actuation inhaler, Inhale 2 puffs into the lungs every 6 (six) hours as needed for Wheezing. Rescue, Disp: 18 g, Rfl: 2    cetirizine (ZYRTEC) 10 MG tablet, Take 10 mg by mouth once daily., Disp: , Rfl:     ergocalciferol (ERGOCALCIFEROL) 50,000 unit Cap, Take 1 capsule (50,000 Units total) by mouth every 7 days., Disp: 12 capsule, Rfl: 0    fluocinolone acetonide oil 0.01 % Drop, Place 1 drop in ear(s) 2 (two) times daily., Disp: 20 mL, Rfl: 0    fluticasone propionate (FLONASE) 50 mcg/actuation nasal spray, 1 spray (50 mcg total) by Each Nare route once daily., Disp: 16 g, Rfl: 2    hydroCHLOROthiazide (HYDRODIURIL) 25 MG tablet, Take 1 tablet (25 mg total) by mouth once daily., Disp: 30 tablet, Rfl: 2    medroxyPROGESTERone (DEPO-PROVERA) 150 mg/mL injection, , Disp: , Rfl:     metFORMIN (GLUCOPHAGE-XR) 500 MG 24 hr tablet, Take 1 tablet (500 mg total) by mouth daily with breakfast., Disp: 30 tablet, Rfl: 1    nabumetone (RELAFEN) 750 MG tablet, Take 1 tablet (750 mg total) by mouth 2 (two) times daily., Disp: 60 tablet, Rfl: 2    ondansetron (ZOFRAN-ODT) 8 MG TbDL, Take 1 tablet (8 mg total) by mouth every 12 (twelve) hours as needed., Disp: 6 tablet, Rfl: 0    spironolactone (ALDACTONE) 50 MG tablet, Take 1 tablet (50 mg total) by mouth once daily., Disp: 30 tablet, Rfl: 1    sumatriptan (IMITREX) 25 MG Tab, 25-50mg q 2 hrs prn (do not exceed 200mg/ 24hrs), Disp: 9 tablet, Rfl: 1    traZODone (DESYREL) 100 MG tablet, Take 1 tablet (100 mg total) by mouth every evening., Disp: 30 tablet, Rfl: 1     zonisamide (ZONEGRAN) 50 MG Cap, Take 1 capsule (50 mg total) by mouth once daily., Disp: 30 capsule, Rfl: 0    ipratropium (ATROVENT) 0.02 % nebulizer solution, Take 2.5 mLs (500 mcg total) by nebulization 4 (four) times daily. Rescue, Disp: 25 mL, Rfl: 0          Objective:   Vitals:  Blood pressure 115/82, pulse 102, temperature 99 °F (37.2 °C), resp. rate 20, weight 122.5 kg (270 lb).       GEN: no apparent distress, comfortable  HEAD: atraumatic and normocephalic  EYES: no pallor, no icterus  ENT: no pharyngeal erythema, external ears WNL; no nasal discharge  NECK: no masses, thyroid normal, trachea midline, no LAD/LN's, supple  CV: RRR with no murmur; normal pulse; normal S1 and S2  CHEST: Normal respiratory effort; CTAB; normal breath sounds; no wheeze or crackles  ABDOM: nontender and nondistended; soft; normal bowel sounds; no rebound/guarding, liver and spleen are not palpable   MUSC/Skeletal: ROM normal; no crepitus; joints normal  EXTREM: Calves are nontender at the left and right, trace edema noted.  SKIN: No petechiae, no purpura, no ecchymoses, no rash   : no spivey  NEURO: grossly intact; motor/sensory WNL; no tremors  PSYCH: normal mood, affect and behavior  LYMPH: normal cervical, supraclavicular, axillary and groin LN's  She left the bra all, I did not examine her breasts    Labs:   Lab Results   Component Value Date    WBC 11.95 06/07/2019    HGB 14.0 06/07/2019    HCT 42.0 06/07/2019    MCV 85 06/07/2019     06/07/2019    CMP  Sodium   Date Value Ref Range Status   06/07/2019 138 136 - 145 mmol/L Final   07/10/2015 140 137 - 145 MMOL/L Final     Potassium   Date Value Ref Range Status   06/07/2019 3.8 3.5 - 5.1 mmol/L Final   07/10/2015 4.1 3.5 - 5.1 MMOL/L Final     Chloride   Date Value Ref Range Status   06/07/2019 106 95 - 110 mmol/L Final   07/10/2015 102 98 - 107 MMOL/L Final     CO2   Date Value Ref Range Status   06/07/2019 25 22 - 31 mmol/L Final     Glucose   Date Value Ref  Range Status   06/07/2019 87 70 - 110 mg/dL Final     Comment:     The ADA recommends the following guidelines for fasting glucose:  Normal:       less than 100 mg/dL  Prediabetes:  100 mg/dL to 125 mg/dL  Diabetes:     126 mg/dL or higher       BUN, Bld   Date Value Ref Range Status   06/07/2019 15 7 - 18 mg/dL Final     Creatinine   Date Value Ref Range Status   06/07/2019 1.35 0.50 - 1.40 mg/dL Final   07/10/2015 0.90 0.52 - 1.04 MG/DL Final     Calcium   Date Value Ref Range Status   06/07/2019 9.4 8.4 - 10.2 mg/dL Final     Total Protein   Date Value Ref Range Status   06/07/2019 6.7 6.0 - 8.4 g/dL Final     Albumin   Date Value Ref Range Status   06/07/2019 4.1 3.5 - 5.2 g/dL Final   07/10/2015 4.2 3.5 - 5.0 G/DL Final     Total Bilirubin   Date Value Ref Range Status   06/07/2019 0.2 0.2 - 1.3 mg/dL Final     Alkaline Phosphatase   Date Value Ref Range Status   06/07/2019 85 38 - 145 U/L Final     AST   Date Value Ref Range Status   06/07/2019 23 14 - 36 U/L Final     ALT   Date Value Ref Range Status   06/07/2019 48 (H) 10 - 44 U/L Final     Anion Gap   Date Value Ref Range Status   06/07/2019 7 (L) 8 - 16 mmol/L Final     eGFR if    Date Value Ref Range Status   06/07/2019 >60 >60 mL/min/1.73 m^2 Final     eGFR if non    Date Value Ref Range Status   06/07/2019 56 (A) >60 mL/min/1.73 m^2 Final     Comment:     Calculation used to obtain the estimated glomerular filtration  rate (eGFR) is the CKD-EPI equation.            Radiology/Diagnostic Studies:    Ultrasound from February 2016 was negative for residual DVT.    U/S 1/11/19 no clot in R leg      Assessment:   (1) 23 y.o. female with diagnosis of Left popliteal vein DVT, while on hormone supplement, for the management of multiple ovarian cyst and pain syndrome.    Now on Depo- progesterone injections 09/13/2017. This raises concern for possible future clot events in the setting of her factor V Leiden carrier state.    She  has strong family history and her maternal grandmother and grandfather for blood clot events, supporting a familial pattern for blood clots.    Estimated risk of clot event on depo-progesterone and with F5L is 1-2% per year of use.  Probably an acceptable risk given the degree of sx control with depo-progesterone and her Polycystic Ovarian Syndrome.    On  ASA 81 mg prophylaxis daily.    Asthma. History of sleep apnea syndrome.      RTC in 6 months.  Can cancel.  RTC 1 year.

## 2020-07-06 ENCOUNTER — OFFICE VISIT (OUTPATIENT)
Dept: HEMATOLOGY/ONCOLOGY | Facility: CLINIC | Age: 25
End: 2020-07-06
Payer: COMMERCIAL

## 2020-07-06 VITALS
HEART RATE: 106 BPM | RESPIRATION RATE: 12 BRPM | WEIGHT: 277 LBS | TEMPERATURE: 98 F | DIASTOLIC BLOOD PRESSURE: 75 MMHG | BODY MASS INDEX: 47.55 KG/M2 | SYSTOLIC BLOOD PRESSURE: 109 MMHG

## 2020-07-06 DIAGNOSIS — D68.51 FACTOR 5 LEIDEN MUTATION, HETEROZYGOUS: ICD-10-CM

## 2020-07-06 DIAGNOSIS — E28.2 PCOS (POLYCYSTIC OVARIAN SYNDROME): ICD-10-CM

## 2020-07-06 DIAGNOSIS — I82.531 CHRONIC DEEP VEIN THROMBOSIS (DVT) OF POPLITEAL VEIN OF RIGHT LOWER EXTREMITY: Primary | ICD-10-CM

## 2020-07-06 PROCEDURE — 99214 PR OFFICE/OUTPT VISIT, EST, LEVL IV, 30-39 MIN: ICD-10-PCS | Mod: S$GLB,,, | Performed by: INTERNAL MEDICINE

## 2020-07-06 PROCEDURE — 3008F PR BODY MASS INDEX (BMI) DOCUMENTED: ICD-10-PCS | Mod: S$GLB,,, | Performed by: INTERNAL MEDICINE

## 2020-07-06 PROCEDURE — 99214 OFFICE O/P EST MOD 30 MIN: CPT | Mod: S$GLB,,, | Performed by: INTERNAL MEDICINE

## 2020-07-06 PROCEDURE — 3008F BODY MASS INDEX DOCD: CPT | Mod: S$GLB,,, | Performed by: INTERNAL MEDICINE

## 2020-07-06 RX ORDER — NORETHINDRONE 0.35 MG
KIT ORAL
COMMUNITY
Start: 2020-06-20 | End: 2023-07-25

## 2020-07-06 NOTE — PROGRESS NOTES
[facility] Hematology Oncology In Office Encounter Progress Note  7/6/20    Subjective:      Patient ID:   Georgie Dunlap  24 y.o. female   [birth year]  Irvin Lester (GYN)  Beba Harrington NP        Chief Complaint:   DVT history    HPI:  24 y.o. female who fell  and hit her right leg with injury. She developed a right popliteal vein DVT approximately 3 years ago. She was treated with Eliquis ×6 months, and a follow-up [facility] the [facility] showed resolution of the DVT.    She has multiple cyst on her ovaries causing her to have pelvic pain. Menses are irregular and heavy. She was given a Depot-progesterone shot on 09/13/2017. Pain symptoms have improved from an 8 out of 10 down to a 3 out of 10.  She is diagnosed with Polycystic Ovarian Syndrome.    She has not had further clot event, even on Depot Rx as above.  She has purchased a home.  Ovarian cysts are felt to have regressed on  Depot Rx.    She is on Norlyda .35 mg now, no recent menses.  Hx polycystic ovarian syndrome, pelvic pain.  Camille Mcmahon MD.  GYN. With [employer].    On ASA daily. Hx F5L+, No recent DVT or clot event.      Other history includes asthma, fibromyalgia, sleep apnea syndrome treated with tonsillectomy at age 18.    She also has history of GERD. 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    She is allergic to sulfa drugs, latex, and plums.    Past history includes the tonsillectomy    Her medications include metformin, the Depo progesterone shot q 3 months, Zyrtec, Xopenex,  Flovent and Flonase.  She plans to be on the Depot x's 4-5 years.  Anticipate 1-2% risk of clot event yearly with the depot birth control med.    She admits to decrease in pelvic pain, on the Depot med.  She is on ASA 81mg daily.    Started a new job, .    Her maternal grandmother and maternal grandfather both  had blood clots in the legs. Her mother had kidney failure with each of HER-2 pregnancies and is status post BTL. Her sister has femoral acetabular impingement syndrome of the left and right hip. The patient has not had pregnancies, she does not have history of miscarriages, and she does not have children.    Previous evaluation showed that she was positive for factor V Leiden.  Completion of hypercoagulation studies were otherwise negative.    On 12/25/18, she had calf pain and rested her leg,  Sx resolved.    ROS:   GEN: normal without any fever, night sweats or weight loss  HEENT: normal with no HA's, sore throat, stiff neck, changes in vision  CV: normal with no CP, SOB, PND, MARTINEZ or orthopnea  PULM: See HPI. no SOB, cough, hemoptysis, sputum or pleuritic pain  GI: normal with no abdominal pain, nausea, vomiting, constipation, diarrhea, melanotic stools, BRBPR, or hematemesis  /GYN: See history of present illness   BREAST: normal with no mass, discharge, pain  SKIN: See history of present illness     Past Medical History:   Diagnosis Date    Allergic rhinitis     Asthma     Factor V Leiden     Fibromyalgia     GERD (gastroesophageal reflux disease)      Past Surgical History:   Procedure Laterality Date    TONSILLECTOMY  07/2014       Review of patient's allergies indicates:   Allergen Reactions    Latex, natural rubber     Plum     Sulfa (sulfonamide antibiotics)      Social History     Socioeconomic History    Marital status: Unknown     Spouse name: Not on file    Number of children: Not on file    Years of education: Not on file    Highest education level: Not on file   Occupational History    Not on file   Social Needs    Financial resource strain: Not on file    Food insecurity     Worry: Not on file     Inability: Not on file    Transportation needs     Medical: Not on file     Non-medical: Not on file   Tobacco Use    Smoking status: Never Smoker    Smokeless tobacco: Never Used    Substance and Sexual Activity    Alcohol use: No     Frequency: 2-4 times a month     Drinks per session: 1 or 2     Binge frequency: Never    Drug use: No    Sexual activity: Yes   Lifestyle    Physical activity     Days per week: Not on file     Minutes per session: Not on file    Stress: Not on file   Relationships    Social connections     Talks on phone: Patient refused     Gets together: Patient refused     Attends Mor[employer] service: Not on file     Active member of club or organization: Patient refused     Attends meetings of clubs or organizations: Patient refused     Relationship status: Living with partner   Other Topics Concern    Not on file   Social History Narrative    Not on file         Current Outpatient Medications:     albuterol (ACCUNEB) 1.25 mg/3 mL Nebu, CONTENTS OF 1 AMPULE IN NEBULIZER EVERY 6 HOURS AS NEEDED (RESCUE), Disp: 90 mL, Rfl: 1    albuterol (PROVENTIL/VENTOLIN HFA) 90 mcg/actuation inhaler, INHALE TWO PUFFS BY MOUTH EVERY SIX HOURS AS NEEDED FOR WHEEZING., Disp: 18 g, Rfl: 0    cetirizine (ZYRTEC) 10 MG tablet, Take 10 mg by mouth once daily., Disp: , Rfl:     fluocinolone acetonide oil 0.01 % Drop, Place 1 drop in ear(s) 2 (two) times daily., Disp: 20 mL, Rfl: 0    fluticasone propionate (FLONASE) 50 mcg/actuation nasal spray, INSTILL ONE SPRAY IN EACH NOSTRIL IN EACH NOSTRIL EVERY DAY, Disp: 16 g, Rfl: 0    hydroCHLOROthiazide (HYDRODIURIL) 25 MG tablet, Take 1 tablet (25 mg total) by mouth once daily. Needs labs, Disp: 30 tablet, Rfl: 0    nabumetone (RELAFEN) 750 MG tablet, TAKE ONE TABLET BY MOUTH TWICE DAILY, Disp: 60 tablet, Rfl: 1    NORLYDA 0.35 mg tablet, , Disp: , Rfl:     ondansetron (ZOFRAN-ODT) 8 MG TbDL, Take 1 tablet (8 mg total) by mouth every 12 (twelve) hours as needed., Disp: 6 tablet, Rfl: 0    spironolactone (ALDACTONE) 50 MG tablet, Take 1 tablet (50 mg total) by mouth once daily. Needs labs, Disp: 30 tablet, Rfl: 0    sumatriptan  (IMITREX) 25 MG Tab, 25-50mg q 2 hrs prn (do not exceed 200mg/ 24hrs), Disp: 9 tablet, Rfl: 1    VITAMIN D2 1,250 mcg (50,000 unit) capsule, TAKE ONE CAPSULE BY MOUTH ONCE EVERY 7 DAYS, Disp: 4 capsule, Rfl: 0    zonisamide (ZONEGRAN) 50 MG Cap, Take 1 capsule (50 mg total) by mouth once daily., Disp: 30 capsule, Rfl: 2    ipratropium (ATROVENT) 0.02 % nebulizer solution, Take 2.5 mLs (500 mcg total) by nebulization 4 (four) times daily. Rescue, Disp: 25 mL, Rfl: 0    metFORMIN (GLUCOPHAGE-XR) 500 MG XR 24hr tablet, Take 1 tablet (500 mg total) by mouth every morning. Needs labs, Disp: 30 tablet, Rfl: 0    traZODone (DESYREL) 100 MG tablet, Take 1 tablet (100 mg total) by mouth every evening., Disp: 30 tablet, Rfl: 1          Objective:   Vitals:  Blood pressure 109/75, pulse 106, temperature 98 °F (36.7 °C), temperature source Oral, resp. rate 12, weight 125.6 kg (277 lb).       GEN: no apparent distress, comfortable  HEAD: atraumatic and normocephalic  EYES: no pallor, no icterus  ENT: no pharyngeal erythema, external ears WNL; no nasal discharge  NECK: no masses, thyroid normal, trachea midline, no LAD/LN's, supple  CV: RRR with no murmur; normal pulse; normal S1 and S2  CHEST: Normal respiratory effort; CTAB; normal breath sounds; no wheeze or crackles  ABDOM: nontender and nondistended; soft; normal bowel sounds; no rebound/guarding, liver and spleen are not palpable   MUSC/Skeletal: ROM normal; no crepitus; joints normal  EXTREM: Calves are nontender at the left and right, trace edema noted.  SKIN: No petechiae, no purpura, no ecchymoses, no rash   : no spivey  NEURO: grossly intact; motor/sensory WNL; no tremors  PSYCH: normal mood, affect and behavior  LYMPH: normal cervical, supraclavicular, axillary and groin LN's  She left the bra all, I did not examine her breasts    Labs:   Lab Results   Component Value Date    WBC 11.95 06/07/2019    HGB 14.0 06/07/2019    HCT 42.0 06/07/2019    MCV 85 06/07/2019      06/07/2019    [facility]        Radiology/Diagnostic Studies:    Ultrasound from February 2016 was negative for residual DVT.    U/S 1/11/19 no clot in R leg      Assessment:   (1) 24 y.o. female with diagnosis of Left popliteal vein DVT, while on hormone supplement, for the management of multiple ovarian cyst and pain syndrome.    Was on Depo- progesterone injections 09/13/2017. This raises concern for possible future clot events in the setting of her factor V Leiden carrier state.    She has strong family history and her maternal grandmother and grandfather for blood clot events, supporting a familial pattern for blood clots.    Estimated risk of clot event on depo-progesterone and with F5L is 1-2% per year of use.  Probably an acceptable risk given the degree of sx control with depo-progesterone and her Polycystic Ovarian Syndrome.    On  ASA 81 mg prophylaxis daily.    She continues on Norlyda daily.    Asthma. History of sleep apnea syndrome.      RTC in 6 months.  Can cancel.  RTC 1 year.

## 2021-05-10 ENCOUNTER — PATIENT MESSAGE (OUTPATIENT)
Dept: RESEARCH | Facility: HOSPITAL | Age: 26
End: 2021-05-10

## 2021-10-18 ENCOUNTER — LAB VISIT (OUTPATIENT)
Dept: LAB | Facility: HOSPITAL | Age: 26
End: 2021-10-18
Attending: INTERNAL MEDICINE
Payer: COMMERCIAL

## 2021-10-18 ENCOUNTER — OFFICE VISIT (OUTPATIENT)
Dept: HEMATOLOGY/ONCOLOGY | Facility: CLINIC | Age: 26
End: 2021-10-18
Payer: COMMERCIAL

## 2021-10-18 ENCOUNTER — TELEPHONE (OUTPATIENT)
Dept: HEMATOLOGY/ONCOLOGY | Facility: CLINIC | Age: 26
End: 2021-10-18

## 2021-10-18 ENCOUNTER — PATIENT MESSAGE (OUTPATIENT)
Dept: HEMATOLOGY/ONCOLOGY | Facility: CLINIC | Age: 26
End: 2021-10-18

## 2021-10-18 VITALS
RESPIRATION RATE: 20 BRPM | HEIGHT: 64 IN | WEIGHT: 281 LBS | DIASTOLIC BLOOD PRESSURE: 74 MMHG | HEART RATE: 79 BPM | SYSTOLIC BLOOD PRESSURE: 115 MMHG | BODY MASS INDEX: 47.97 KG/M2

## 2021-10-18 DIAGNOSIS — M79.89 RIGHT LEG SWELLING: ICD-10-CM

## 2021-10-18 DIAGNOSIS — I82.531 CHRONIC DEEP VEIN THROMBOSIS (DVT) OF POPLITEAL VEIN OF RIGHT LOWER EXTREMITY: Primary | ICD-10-CM

## 2021-10-18 DIAGNOSIS — D68.51 FACTOR 5 LEIDEN MUTATION, HETEROZYGOUS: ICD-10-CM

## 2021-10-18 DIAGNOSIS — R22.41 LOCALIZED SWELLING OF RIGHT LOWER EXTREMITY: ICD-10-CM

## 2021-10-18 LAB
ALBUMIN SERPL BCP-MCNC: 4.1 G/DL (ref 3.5–5.2)
ALP SERPL-CCNC: 66 U/L (ref 55–135)
ALT SERPL W/O P-5'-P-CCNC: 41 U/L (ref 10–44)
ANION GAP SERPL CALC-SCNC: 9 MMOL/L (ref 8–16)
AST SERPL-CCNC: 30 U/L (ref 10–40)
BASOPHILS # BLD AUTO: 0.08 K/UL (ref 0–0.2)
BASOPHILS NFR BLD: 0.8 % (ref 0–1.9)
BILIRUB SERPL-MCNC: 0.3 MG/DL (ref 0.1–1)
BUN SERPL-MCNC: 15 MG/DL (ref 6–20)
CALCIUM SERPL-MCNC: 9.2 MG/DL (ref 8.7–10.5)
CHLORIDE SERPL-SCNC: 107 MMOL/L (ref 95–110)
CO2 SERPL-SCNC: 25 MMOL/L (ref 23–29)
CREAT SERPL-MCNC: 0.9 MG/DL (ref 0.5–1.4)
D DIMER PPP IA.FEU-MCNC: 0.41 UG/ML FEU
DIFFERENTIAL METHOD: NORMAL
EOSINOPHIL # BLD AUTO: 0.3 K/UL (ref 0–0.5)
EOSINOPHIL NFR BLD: 2.4 % (ref 0–8)
ERYTHROCYTE [DISTWIDTH] IN BLOOD BY AUTOMATED COUNT: 12.2 % (ref 11.5–14.5)
EST. GFR  (AFRICAN AMERICAN): >60 ML/MIN/1.73 M^2
EST. GFR  (NON AFRICAN AMERICAN): >60 ML/MIN/1.73 M^2
GLUCOSE SERPL-MCNC: 95 MG/DL (ref 70–110)
HCG SERPL QL: NEGATIVE
HCT VFR BLD AUTO: 41.1 % (ref 37–48.5)
HGB BLD-MCNC: 13.8 G/DL (ref 12–16)
IMM GRANULOCYTES # BLD AUTO: 0.04 K/UL (ref 0–0.04)
IMM GRANULOCYTES NFR BLD AUTO: 0.4 % (ref 0–0.5)
LYMPHOCYTES # BLD AUTO: 4.6 K/UL (ref 1–4.8)
LYMPHOCYTES NFR BLD: 44 % (ref 18–48)
MCH RBC QN AUTO: 30.1 PG (ref 27–31)
MCHC RBC AUTO-ENTMCNC: 33.6 G/DL (ref 32–36)
MCV RBC AUTO: 90 FL (ref 82–98)
MONOCYTES # BLD AUTO: 1 K/UL (ref 0.3–1)
MONOCYTES NFR BLD: 9.5 % (ref 4–15)
NEUTROPHILS # BLD AUTO: 4.5 K/UL (ref 1.8–7.7)
NEUTROPHILS NFR BLD: 42.9 % (ref 38–73)
NRBC BLD-RTO: 0 /100 WBC
PLATELET # BLD AUTO: 336 K/UL (ref 150–450)
PMV BLD AUTO: 9.9 FL (ref 9.2–12.9)
POTASSIUM SERPL-SCNC: 3.8 MMOL/L (ref 3.5–5.1)
PROT SERPL-MCNC: 7.2 G/DL (ref 6–8.4)
RBC # BLD AUTO: 4.59 M/UL (ref 4–5.4)
SODIUM SERPL-SCNC: 141 MMOL/L (ref 136–145)
WBC # BLD AUTO: 10.37 K/UL (ref 3.9–12.7)

## 2021-10-18 PROCEDURE — 3078F PR MOST RECENT DIASTOLIC BLOOD PRESSURE < 80 MM HG: ICD-10-PCS | Mod: S$GLB,,, | Performed by: NURSE PRACTITIONER

## 2021-10-18 PROCEDURE — 3008F PR BODY MASS INDEX (BMI) DOCUMENTED: ICD-10-PCS | Mod: S$GLB,,, | Performed by: NURSE PRACTITIONER

## 2021-10-18 PROCEDURE — 3078F DIAST BP <80 MM HG: CPT | Mod: S$GLB,,, | Performed by: NURSE PRACTITIONER

## 2021-10-18 PROCEDURE — 3074F SYST BP LT 130 MM HG: CPT | Mod: S$GLB,,, | Performed by: NURSE PRACTITIONER

## 2021-10-18 PROCEDURE — 85025 COMPLETE CBC W/AUTO DIFF WBC: CPT | Performed by: INTERNAL MEDICINE

## 2021-10-18 PROCEDURE — 36415 COLL VENOUS BLD VENIPUNCTURE: CPT | Performed by: INTERNAL MEDICINE

## 2021-10-18 PROCEDURE — 80053 COMPREHEN METABOLIC PANEL: CPT | Performed by: INTERNAL MEDICINE

## 2021-10-18 PROCEDURE — 85379 FIBRIN DEGRADATION QUANT: CPT | Performed by: INTERNAL MEDICINE

## 2021-10-18 PROCEDURE — 3074F PR MOST RECENT SYSTOLIC BLOOD PRESSURE < 130 MM HG: ICD-10-PCS | Mod: S$GLB,,, | Performed by: NURSE PRACTITIONER

## 2021-10-18 PROCEDURE — 3044F PR MOST RECENT HEMOGLOBIN A1C LEVEL <7.0%: ICD-10-PCS | Mod: S$GLB,,, | Performed by: NURSE PRACTITIONER

## 2021-10-18 PROCEDURE — 99213 PR OFFICE/OUTPT VISIT, EST, LEVL III, 20-29 MIN: ICD-10-PCS | Mod: S$GLB,,, | Performed by: NURSE PRACTITIONER

## 2021-10-18 PROCEDURE — 3008F BODY MASS INDEX DOCD: CPT | Mod: S$GLB,,, | Performed by: NURSE PRACTITIONER

## 2021-10-18 PROCEDURE — 84703 CHORIONIC GONADOTROPIN ASSAY: CPT | Performed by: NURSE PRACTITIONER

## 2021-10-18 PROCEDURE — 99213 OFFICE O/P EST LOW 20 MIN: CPT | Mod: S$GLB,,, | Performed by: NURSE PRACTITIONER

## 2021-10-18 PROCEDURE — 3044F HG A1C LEVEL LT 7.0%: CPT | Mod: S$GLB,,, | Performed by: NURSE PRACTITIONER

## 2021-10-19 ENCOUNTER — TELEPHONE (OUTPATIENT)
Dept: HEMATOLOGY/ONCOLOGY | Facility: CLINIC | Age: 26
End: 2021-10-19

## 2022-01-10 ENCOUNTER — OFFICE VISIT (OUTPATIENT)
Dept: HEMATOLOGY/ONCOLOGY | Facility: CLINIC | Age: 27
End: 2022-01-10
Payer: COMMERCIAL

## 2022-01-10 VITALS
BODY MASS INDEX: 48.32 KG/M2 | HEIGHT: 64 IN | RESPIRATION RATE: 18 BRPM | WEIGHT: 283 LBS | DIASTOLIC BLOOD PRESSURE: 85 MMHG | HEART RATE: 74 BPM | SYSTOLIC BLOOD PRESSURE: 127 MMHG

## 2022-01-10 DIAGNOSIS — I82.531 CHRONIC DEEP VEIN THROMBOSIS (DVT) OF POPLITEAL VEIN OF RIGHT LOWER EXTREMITY: Primary | ICD-10-CM

## 2022-01-10 DIAGNOSIS — E28.2 PCOS (POLYCYSTIC OVARIAN SYNDROME): ICD-10-CM

## 2022-01-10 DIAGNOSIS — D68.51 FACTOR 5 LEIDEN MUTATION, HETEROZYGOUS: ICD-10-CM

## 2022-01-10 PROCEDURE — 99214 PR OFFICE/OUTPT VISIT, EST, LEVL IV, 30-39 MIN: ICD-10-PCS | Mod: S$GLB,,, | Performed by: INTERNAL MEDICINE

## 2022-01-10 PROCEDURE — 99214 OFFICE O/P EST MOD 30 MIN: CPT | Mod: S$GLB,,, | Performed by: INTERNAL MEDICINE

## 2022-01-10 PROCEDURE — 3074F PR MOST RECENT SYSTOLIC BLOOD PRESSURE < 130 MM HG: ICD-10-PCS | Mod: S$GLB,,, | Performed by: INTERNAL MEDICINE

## 2022-01-10 PROCEDURE — 3079F DIAST BP 80-89 MM HG: CPT | Mod: S$GLB,,, | Performed by: INTERNAL MEDICINE

## 2022-01-10 PROCEDURE — 3079F PR MOST RECENT DIASTOLIC BLOOD PRESSURE 80-89 MM HG: ICD-10-PCS | Mod: S$GLB,,, | Performed by: INTERNAL MEDICINE

## 2022-01-10 PROCEDURE — 3074F SYST BP LT 130 MM HG: CPT | Mod: S$GLB,,, | Performed by: INTERNAL MEDICINE

## 2022-01-10 PROCEDURE — 3008F PR BODY MASS INDEX (BMI) DOCUMENTED: ICD-10-PCS | Mod: S$GLB,,, | Performed by: INTERNAL MEDICINE

## 2022-01-10 PROCEDURE — 3008F BODY MASS INDEX DOCD: CPT | Mod: S$GLB,,, | Performed by: INTERNAL MEDICINE

## 2022-01-11 NOTE — PROGRESS NOTES
[facility] Hematology Oncology In Office Subsequent Encounter Note  1/10/22    Subjective:      Patient ID:   Georgie Dunlap  26 y.o. female   [birth year]  Irvin Lester (GYN)  Beba Harrington NP        Chief Complaint:   DVT history    HPI:  26 y.o. female who fell  and hit her right leg with injury. She developed a right popliteal vein DVT approximately 3 years ago. She was treated with Eliquis ×6 months, and a follow-up ultrasound of the leg at [facility] showed resolution of the DVT.    Hx covid x's 2.  ASA 81 mg daily.    She has multiple cyst on her ovaries causing her to have pelvic pain. Menses are irregular and heavy. She was given a Depot-progesterone shot on 09/13/2017. Pain symptoms have improved from an 8 out of 10 down to a 3 out of 10.  She is diagnosed with Polycystic Ovarian Syndrome.    She has not had further clot event, even on Depot Rx as above.  She has purchased a home.  Ovarian cysts are felt to have regressed on  Depot Rx.    She was on Norlyda .35 mg, no recent menses.  Hx polycystic ovarian syndrome, pelvic pain.  Camille Mcmahon MD.  GYN. With [employer].  Off BCP now.    On ASA daily. Hx F5L+, No recent DVT or clot event.      Other history includes asthma, fibromyalgia, sleep apnea syndrome treated with tonsillectomy at age 18.    She also has history of GERD. 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    She is allergic to sulfa drugs, latex, and plums.    Past history includes the tonsillectomy    Her medications include metformin, the Depo progesterone shot q 3 months, Zyrtec, Xopenex,  Flovent and Flonase.  She plans to be on the Depot x's 4-5 years.  Anticipate 1-2% risk of clot event yearly with the depot birth control med.    She admits to decrease in pelvic pain, on the Depot med.  She is on ASA 81mg daily.    Started a new job, ., Title  clearance.    Her maternal grandmother and maternal grandfather both had blood clots in the legs. Her mother had kidney failure with each of HER-2 pregnancies and is status post BTL. Her sister has femoral acetabular impingement syndrome of the left and right hip. The patient has not had pregnancies, she does not have history of miscarriages, and she does not have children.    Previous evaluation showed that she was positive for factor V Leiden.  Completion of hypercoagulation studies were otherwise negative.    On 12/25/18, she had calf pain and rested her leg,  Sx resolved.    ROS:   GEN: normal without any fever, night sweats or weight loss  HEENT: normal with no HA's, sore throat, stiff neck, changes in vision  CV: normal with no CP, SOB, PND, MARTINEZ or orthopnea  PULM: See HPI. no SOB, cough, hemoptysis, sputum or pleuritic pain  GI: normal with no abdominal pain, nausea, vomiting, constipation, diarrhea, melanotic stools, BRBPR, or hematemesis  /GYN: See history of present illness   BREAST: normal with no mass, discharge, pain  SKIN: See history of present illness     Past Medical History:   Diagnosis Date    Allergic rhinitis     Asthma     Factor V Leiden     Fibromyalgia     GERD (gastroesophageal reflux disease)      Past Surgical History:   Procedure Laterality Date    TONSILLECTOMY  07/2014       Review of patient's allergies indicates:   Allergen Reactions    Latex, natural rubber     Plum     Sulfa (sulfonamide antibiotics)      Social History     Socioeconomic History    Marital status: Unknown   Tobacco Use    Smoking status: Never Smoker    Smokeless tobacco: Never Used   Substance and Sexual Activity    Alcohol use: No    Drug use: No    Sexual activity: Yes     Social Determinants of Health     Financial Resource Strain: Unknown    Difficulty of Paying Living Expenses: Patient refused   Food Insecurity: Unknown    Worried About Running Out of Food in the Last Year: Patient refused     Ran Out of Food in the Last Year: Patient refused   Transportation Needs: Unknown    Lack of Transportation (Medical): Patient refused    Lack of Transportation (Non-Medical): Patient refused   Physical Activity: Unknown    Days of Exercise per Week: Patient refused    Minutes of Exercise per Session: 30 min   Stress: Unknown    Feeling of Stress : Patient refused   Social Connections: Unknown    Frequency of Communication with Friends and Family: Patient refused    Frequency of Social Gatherings with Friends and Family: Patient refused    Active Member of Clubs or Organizations: Patient refused    Attends Club or Organization Meetings: Patient refused    Marital Status: Patient refused   Housing Stability: Unknown    Unable to Pay for Housing in the Last Year: Patient refused    Unstable Housing in the Last Year: Patient refused         Current Outpatient Medications:     albuterol (ACCUNEB) 1.25 mg/3 mL Nebu, CONTENTS OF 1 AMPULE IN NEBULIZER EVERY 6 HOURS AS NEEDED (RESCUE), Disp: 90 mL, Rfl: 1    albuterol (PROVENTIL/VENTOLIN HFA) 90 mcg/actuation inhaler, INHALE TWO PUFFS BY MOUTH EVERY SIX HOURS AS NEEDED FOR WHEEZING., Disp: 18 g, Rfl: 0    cetirizine (ZYRTEC) 10 MG tablet, Take 10 mg by mouth once daily., Disp: , Rfl:     ergocalciferol (ERGOCALCIFEROL) 50,000 unit Cap, Take 1 capsule (50,000 Units total) by mouth every 7 days., Disp: 12 capsule, Rfl: 1    fluticasone propionate (FLONASE) 50 mcg/actuation nasal spray, INSTILL ONE SPRAY IN EACH NOSTRIL IN EACH NOSTRIL EVERY DAY, Disp: 16 g, Rfl: 0    guaiFENesin-codeine 100-10 mg/5 ml (TUSSI-ORGANIDIN NR)  mg/5 mL syrup, Take 5 mLs by mouth nightly as needed for Congestion., Disp: 120 mL, Rfl: 0    ipratropium (ATROVENT) 0.02 % nebulizer solution, Take 2.5 mLs (500 mcg total) by nebulization 4 (four) times daily. Rescue, Disp: 25 mL, Rfl: 0    liraglutide 0.6 mg/0.1 mL, 18 mg/3 mL, subq PNIJ (VICTOZA 2-SHARON) 0.6 mg/0.1 mL (18 mg/3  "mL) PnIj pen, 0.6mg SC daily x 2 weeks, then 1.2mg daily. (Patient not taking: Reported on 10/18/2021), Disp: 3 mL, Rfl: 1    nabumetone (RELAFEN) 750 MG tablet, Take 1 tablet (750 mg total) by mouth 2 (two) times daily., Disp: 60 tablet, Rfl: 0    norethindrone (CAT ORAL), Take by mouth., Disp: , Rfl:     NORLYDA 0.35 mg tablet, , Disp: , Rfl:     ondansetron (ZOFRAN-ODT) 8 MG TbDL, DISSOLVE ONE TABLET IN MOUTH EVERY TWELVE HOURS AS NEEDED, Disp: 12 tablet, Rfl: 0    pen needle, diabetic (PEN NEEDLE) 31 gauge x 3/16" Ndle, 1 each by Misc.(Non-Drug; Combo Route) route once daily., Disp: 30 each, Rfl: 1    spironolactone (ALDACTONE) 50 MG tablet, Take 1 tablet (50 mg total) by mouth once daily. Needs labs for further refills, Disp: 30 tablet, Rfl: 2    traZODone (DESYREL) 100 MG tablet, 50-100mg nightly as needed for insomnia, Disp: 30 tablet, Rfl: 1    zonisamide (ZONEGRAN) 50 MG Cap, Take 1 capsule (50 mg total) by mouth once daily., Disp: 30 capsule, Rfl: 2          Objective:   Vitals:  Blood pressure 127/85, pulse 74, resp. rate 18, height 5' 4" (1.626 m), weight 128.4 kg (283 lb).       GEN: no apparent distress, comfortable  HEAD: atraumatic and normocephalic  EYES: no pallor, no icterus  ENT: no pharyngeal erythema, external ears WNL; no nasal discharge  NECK: no masses, thyroid normal, trachea midline, no LAD/LN's, supple  CV: RRR with no murmur; normal pulse; normal S1 and S2  CHEST: Normal respiratory effort; CTAB; normal breath sounds; no wheeze or crackles  ABDOM: nontender and nondistended; soft; normal bowel sounds; no rebound/guarding, liver and spleen are not palpable   MUSC/Skeletal: ROM normal; no crepitus; joints normal  EXTREM: Calves are nontender at the left and right, trace edema noted.  SKIN: No petechiae, no purpura, no ecchymoses, no rash   : no spivey  NEURO: grossly intact; motor/sensory WNL; no tremors  PSYCH: normal mood, affect and behavior  LYMPH: normal cervical, " supraclavicular, axillary and groin LN's  She left the bra all, I did not examine her breasts    Labs:   Lab Results   Component Value Date    WBC 10.37 10/18/2021    HGB 13.8 10/18/2021    HCT 41.1 10/18/2021    MCV 90 10/18/2021     10/18/2021    CMP        Radiology/Diagnostic Studies:    Ultrasound from February 2016 was negative for residual DVT.    U/S 1/11/19 no clot in R leg      Assessment:   (1) 26 y.o. female with diagnosis of Left popliteal vein DVT, while on hormone supplement, for the management of multiple ovarian cyst and pain syndrome.    Was on Depo- progesterone injections 09/13/2017. This raises concern for possible future clot events in the setting of her factor V Leiden carrier state.    She has strong family history and her maternal grandmother and grandfather for blood clot events, supporting a familial pattern for blood clots.    Estimated risk of clot event on depo-progesterone and with F5L is 1-2% per year of use.  Probably an acceptable risk given the degree of sx control with depo-progesterone and her Polycystic Ovarian Syndrome.    On  ASA 81 mg prophylaxis daily.    She was on Norlyda daily.  Off BCP now.    Asthma. History of sleep apnea syndrome.  S/P T & A.     RTC in 6 months.

## 2023-07-25 PROBLEM — E66.01 MORBID OBESITY: Status: ACTIVE | Noted: 2023-07-25

## 2023-07-25 PROBLEM — M54.42 CHRONIC BILATERAL LOW BACK PAIN WITH LEFT-SIDED SCIATICA: Status: ACTIVE | Noted: 2023-07-25

## 2023-07-25 PROBLEM — E55.9 VITAMIN D INSUFFICIENCY: Status: ACTIVE | Noted: 2023-07-25

## 2023-07-25 PROBLEM — F32.A ANXIETY AND DEPRESSION: Status: ACTIVE | Noted: 2023-07-25

## 2023-07-25 PROBLEM — F51.05 INSOMNIA DUE TO OTHER MENTAL DISORDER: Status: ACTIVE | Noted: 2023-07-25

## 2023-07-25 PROBLEM — G89.29 CHRONIC NECK PAIN: Status: ACTIVE | Noted: 2023-07-25

## 2023-07-25 PROBLEM — F99 INSOMNIA DUE TO OTHER MENTAL DISORDER: Status: ACTIVE | Noted: 2023-07-25

## 2023-07-25 PROBLEM — G89.29 CHRONIC BILATERAL LOW BACK PAIN WITH LEFT-SIDED SCIATICA: Status: ACTIVE | Noted: 2023-07-25

## 2023-07-25 PROBLEM — M54.2 CHRONIC NECK PAIN: Status: ACTIVE | Noted: 2023-07-25

## 2023-07-25 PROBLEM — F41.9 ANXIETY AND DEPRESSION: Status: ACTIVE | Noted: 2023-07-25

## 2023-07-26 ENCOUNTER — TELEPHONE (OUTPATIENT)
Dept: PAIN MEDICINE | Facility: CLINIC | Age: 28
End: 2023-07-26

## 2023-08-08 ENCOUNTER — TELEPHONE (OUTPATIENT)
Dept: FAMILY MEDICINE | Facility: CLINIC | Age: 28
End: 2023-08-08

## 2023-08-08 NOTE — TELEPHONE ENCOUNTER
Lmom for pt to call her insurance to get list of rheumatology providers that may have availability since our rheum dept does not

## 2024-01-03 PROBLEM — Q50.1 MULTIPLE DEVELOPMENTAL OVARIAN CYSTS: Status: RESOLVED | Noted: 2017-10-09 | Resolved: 2024-01-03

## 2024-01-03 PROBLEM — I82.531 CHRONIC DEEP VEIN THROMBOSIS (DVT) OF POPLITEAL VEIN OF RIGHT LOWER EXTREMITY: Status: RESOLVED | Noted: 2017-10-09 | Resolved: 2024-01-03

## 2024-01-04 PROBLEM — F41.9 ANXIETY AND DEPRESSION: Chronic | Status: ACTIVE | Noted: 2023-07-25

## 2024-01-04 PROBLEM — E28.2 PCOS (POLYCYSTIC OVARIAN SYNDROME): Status: RESOLVED | Noted: 2017-11-21 | Resolved: 2024-01-04

## 2024-01-04 PROBLEM — F90.0 ATTENTION DEFICIT HYPERACTIVITY DISORDER (ADHD), PREDOMINANTLY INATTENTIVE TYPE: Chronic | Status: ACTIVE | Noted: 2023-11-06

## 2024-01-04 PROBLEM — F90.0 ATTENTION DEFICIT HYPERACTIVITY DISORDER (ADHD), PREDOMINANTLY INATTENTIVE TYPE: Status: ACTIVE | Noted: 2023-11-06

## 2024-01-04 PROBLEM — F32.A ANXIETY AND DEPRESSION: Chronic | Status: ACTIVE | Noted: 2023-07-25

## 2024-05-20 PROBLEM — R11.2 INTRACTABLE NAUSEA AND VOMITING: Status: ACTIVE | Noted: 2024-05-20

## 2024-05-20 PROBLEM — K85.90 ACUTE PANCREATITIS: Status: ACTIVE | Noted: 2024-05-20

## 2024-05-21 PROBLEM — E87.6 HYPOKALEMIA: Status: ACTIVE | Noted: 2024-05-21

## 2024-05-23 ENCOUNTER — TELEPHONE (OUTPATIENT)
Dept: GASTROENTEROLOGY | Facility: CLINIC | Age: 29
End: 2024-05-23
Payer: COMMERCIAL

## 2024-05-23 PROBLEM — K29.00 ACUTE GASTRITIS WITHOUT HEMORRHAGE: Status: ACTIVE | Noted: 2024-05-23

## 2024-05-23 NOTE — TELEPHONE ENCOUNTER
----- Message from Kym Miranda sent at 5/23/2024 10:03 AM CDT -----  Type:  Sooner Appointment Request    Caller is requesting a sooner appointment.  Caller declined first available appointment listed below.  Caller will not accept being placed on the waitlist and is requesting a message be sent to doctor.    Name of Caller:  pt  When is the first available appointment?  none  Symptoms:  hospital f/u dc 5/23 need 2 week f/u  Would the patient rather a call back or a response via [facility]? call  Best Call Back Number:  [phone] (home)     Additional Information:  please call and advise--thank you

## 2024-05-24 ENCOUNTER — TELEPHONE (OUTPATIENT)
Dept: GASTROENTEROLOGY | Facility: CLINIC | Age: 29
End: 2024-05-24
Payer: COMMERCIAL

## 2024-05-24 NOTE — TELEPHONE ENCOUNTER
----- Message from Dimas Masterson LPN sent at 5/23/2024  4:59 PM CDT -----  Contact: self    ----- Message -----  From: Alysa Schultz  Sent: 5/23/2024   4:57 PM CDT  To: [facility] Gastro Clinical Staff    Type:  Sooner Appointment Request    Name of Caller:  pt  When is the first available appointment?  N/A  Symptoms:  hosp f/u dc 5/23 need 2 week f/u  Would the patient rather a call back or a response via [facility]? call  Best Call Back Number:  [phone] (home)   Please call and advise... Thank you

## 2024-05-27 ENCOUNTER — LAB VISIT (OUTPATIENT)
Dept: LAB | Facility: HOSPITAL | Age: 29
End: 2024-05-27
Attending: NURSE PRACTITIONER
Payer: COMMERCIAL

## 2024-05-27 ENCOUNTER — OFFICE VISIT (OUTPATIENT)
Dept: GASTROENTEROLOGY | Facility: CLINIC | Age: 29
End: 2024-05-27
Payer: COMMERCIAL

## 2024-05-27 VITALS — WEIGHT: 238.75 LBS | HEIGHT: 64 IN | BODY MASS INDEX: 40.76 KG/M2

## 2024-05-27 DIAGNOSIS — Z87.19 HISTORY OF GASTRITIS: ICD-10-CM

## 2024-05-27 DIAGNOSIS — Z87.39 HISTORY OF FIBROMYALGIA: ICD-10-CM

## 2024-05-27 DIAGNOSIS — R74.8 ELEVATED LIPASE: ICD-10-CM

## 2024-05-27 DIAGNOSIS — K21.00 GASTROESOPHAGEAL REFLUX DISEASE WITH ESOPHAGITIS WITHOUT HEMORRHAGE: ICD-10-CM

## 2024-05-27 DIAGNOSIS — R63.4 WEIGHT LOSS: ICD-10-CM

## 2024-05-27 DIAGNOSIS — K44.9 HIATAL HERNIA: ICD-10-CM

## 2024-05-27 DIAGNOSIS — R19.7 INTERMITTENT DIARRHEA: ICD-10-CM

## 2024-05-27 DIAGNOSIS — R10.13 EPIGASTRIC PAIN: ICD-10-CM

## 2024-05-27 DIAGNOSIS — R79.89 ELEVATED LFTS: ICD-10-CM

## 2024-05-27 DIAGNOSIS — R63.0 DECREASED APPETITE: ICD-10-CM

## 2024-05-27 DIAGNOSIS — R10.33 PERIUMBILICAL PAIN: ICD-10-CM

## 2024-05-27 DIAGNOSIS — Z09 HOSPITAL DISCHARGE FOLLOW-UP: Primary | ICD-10-CM

## 2024-05-27 DIAGNOSIS — Z98.890 HISTORY OF ESOPHAGOGASTRODUODENOSCOPY (EGD): ICD-10-CM

## 2024-05-27 DIAGNOSIS — R11.2 NON-INTRACTABLE VOMITING WITH NAUSEA: ICD-10-CM

## 2024-05-27 LAB
ALBUMIN SERPL BCP-MCNC: 3.7 G/DL (ref 3.5–5.2)
ALP SERPL-CCNC: 58 U/L (ref 55–135)
ALT SERPL W/O P-5'-P-CCNC: 102 U/L (ref 10–44)
AST SERPL-CCNC: 58 U/L (ref 10–40)
BILIRUB DIRECT SERPL-MCNC: 0.1 MG/DL (ref 0.1–0.3)
BILIRUB SERPL-MCNC: 0.3 MG/DL (ref 0.1–1)
LIPASE SERPL-CCNC: 143 U/L (ref 4–60)
PROT SERPL-MCNC: 6.6 G/DL (ref 6–8.4)

## 2024-05-27 PROCEDURE — 1111F DSCHRG MED/CURRENT MED MERGE: CPT | Mod: CPTII,S$GLB,, | Performed by: NURSE PRACTITIONER

## 2024-05-27 PROCEDURE — 36415 COLL VENOUS BLD VENIPUNCTURE: CPT | Mod: PO | Performed by: NURSE PRACTITIONER

## 2024-05-27 PROCEDURE — 1160F RVW MEDS BY RX/DR IN RCRD: CPT | Mod: CPTII,S$GLB,, | Performed by: NURSE PRACTITIONER

## 2024-05-27 PROCEDURE — 3008F BODY MASS INDEX DOCD: CPT | Mod: CPTII,S$GLB,, | Performed by: NURSE PRACTITIONER

## 2024-05-27 PROCEDURE — 1159F MED LIST DOCD IN RCRD: CPT | Mod: CPTII,S$GLB,, | Performed by: NURSE PRACTITIONER

## 2024-05-27 PROCEDURE — 99999 PR PBB SHADOW E&M-EST. PATIENT-LVL III: CPT | Mod: PBBFAC,,, | Performed by: NURSE PRACTITIONER

## 2024-05-27 PROCEDURE — 83690 ASSAY OF LIPASE: CPT | Performed by: NURSE PRACTITIONER

## 2024-05-27 PROCEDURE — 80076 HEPATIC FUNCTION PANEL: CPT | Performed by: NURSE PRACTITIONER

## 2024-05-27 PROCEDURE — 99214 OFFICE O/P EST MOD 30 MIN: CPT | Mod: S$GLB,,, | Performed by: NURSE PRACTITIONER

## 2024-05-27 RX ORDER — PANTOPRAZOLE SODIUM 40 MG/1
TABLET, DELAYED RELEASE ORAL
Qty: 90 TABLET | Refills: 0 | Status: ON HOLD | OUTPATIENT
Start: 2024-05-27 | End: 2024-06-01 | Stop reason: HOSPADM

## 2024-05-27 RX ORDER — SUCRALFATE 1 G/10ML
1 SUSPENSION ORAL
Qty: 1200 ML | Refills: 0 | Status: SHIPPED | OUTPATIENT
Start: 2024-05-27 | End: 2024-06-26

## 2024-05-27 NOTE — PROGRESS NOTES
"Subjective:       Patient ID: Georgie Dunlap is a 28 y.o. female Body mass index is 40.98 kg/m².    Chief Complaint: Hospital Follow Up, Nausea, and Abdominal Pain    This patient is new to me.  Referring Provider: Richard SERNA for generalized abdominal pain, mesenteric adenitis, GERD, & decreased appetite.  Established patient of Dr. Rojas.     Patient is here with significant other, whom assisted with history. Seeing PCP tomorrow for hospital follow-up.    Reviewed hospital discharge summary: "Admission Date: 5/20/2024  Hospital Length of Stay: 0 days  Discharge Date and Time:  05/23/2024 9:46 AM  Attending Physician: Zacarias Stone MD   Discharging Provider: Vinnie Bangura NP  Primary Care Provider: aSvi Parks DO  Primary Care Team: Networked reference to record PCT   HPI:   29 yo female with a PMH of anxiety and depression, asthma, ADHD, DVT, factor V leiden, fibromyalgia, GERD, PCOS, vitamin D deficiency present with generalized abdominal pain, n/v (nonbloody) x 1 day. She was positive for THC. She was given IV analgesics and antiemetics in the ED and continues with symptoms. She states this happened once before when she ate primed rib; which she ate prime rib again prior to this episode. She denies HA, vision changes, dizziness, lightheadedness, CP, SOB, diarrhea, fevers, chills, cough, urinary changes, LE edema, ETOH, tobacco.  Procedure(s) (LRB):  EGD (ESOPHAGOGASTRODUODENOSCOPY) (N/A)    Hospital Course:   Patient was admitted to CDU for further observation and medical management of recent nausea and vomiting that is intractable. An abdominal ultrasound did not show any acute findings. Repeat lactic acidosis remained elevated, however she remained afebrile and currently on IV Zosyn. She also remains on IVFs and recent hypokalemia has been restored. She was seen by GI and recommended an EGD on 5/22/24 since she has been NPO. Per GI: LA Grade C reflux esophagitis without " "bleeding, small hiatal hernia, gastritis with normal duodenum. She is now tolerating clear liquids and agrees to be discharged to home. She will follow up with GI for biopsy results. She will be on PPI as well as Carafate with meals and HS. She is voiding well and ambulates with steadiness. She is safe for discharge."    GI Problem  The primary symptoms include weight loss (reports decreased appetite for the past 2 years; stable since hospitalization), abdominal pain, nausea (improving since hospital discharge), vomiting (occasional; last had yesterday after she got flushed; emesis was of dinner of chicken and carrots) and diarrhea. Primary symptoms do not include fever, fatigue, melena, hematemesis, jaundice, hematochezia or dysuria. The illness began more than 7 days ago.   The abdominal pain began more than 2 days ago (started ~5/18/2024, reports participated in a nurf gun [employer] and reports she had got tackled accidentally; had initially improved after taking tums, recurred after eating prime rib). Progression: slightly improved since hospital discharge. The abdominal pain is located in the epigastric region, LUQ and periumbilical region (described as soreness & churning discomfort that is more sharp). The severity of the abdominal pain is 6/10 (currently). Relieved by: rest; worse with stress & prime rib, movement & walking.   The diarrhea began more than 1 week ago. Daily occurrences: intermittent diarrhea occurs ~2 times a week of several loose stools that day; otherwise bowel movements are twice daily of formed stools. Risk factors: reports IV antibiotic in hospital for possible sepsis; recent hospitalization, denies recent foreign travel, suspect food intake,or ill contacts.   The illness is also significant for odynophagia. The illness does not include chills, dysphagia or constipation. Associated symptoms comments: TREATMENT: protonix 40 mg once daily, carafate 1 gram QID, probiotic daily. Significant " associated medical issues include GERD (none recently). Associated medical issues do not include inflammatory bowel disease.     Review of Systems   Constitutional:  Positive for weight loss (reports decreased appetite for the past 2 years; stable since hospitalization). Negative for appetite change, chills, fatigue and fever.        Not taking NSAID currently but prior was taking NSAID frequently for history of fibromyalgia   HENT:  Negative for sore throat and trouble swallowing.    Respiratory:  Negative for cough, choking and shortness of breath.    Cardiovascular:  Negative for chest pain.   Gastrointestinal:  Positive for abdominal pain, diarrhea, nausea (improving since hospital discharge) and vomiting (occasional; last had yesterday after she got flushed; emesis was of dinner of chicken and carrots). Negative for anal bleeding, blood in stool, constipation, dysphagia, hematemesis, hematochezia, jaundice, melena and rectal pain.   Genitourinary:  Negative for difficulty urinating, dysuria and flank pain.   Neurological:  Negative for weakness.       No LMP recorded (lmp unknown).  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    PCOS (polycystic ovarian syndrome)     Vitamin D insufficiency      Past Surgical History:   Procedure Laterality Date    ESOPHAGOGASTRODUODENOSCOPY N/A 05/22/2024    Procedure: EGD (ESOPHAGOGASTRODUODENOSCOPY);  Surgeon: Ean Rojas MD;  Location: [facility];  Service: Gastroenterology;  Laterality: N/A;    TONSILLECTOMY  07/01/2014     Family History   Problem Relation  Name Age of Onset    Migraines Mother      Diverticulitis Father      Stroke Maternal Grandmother      Migraines Maternal Grandmother      Gallbladder disease Maternal Grandmother      Hearing loss Maternal Grandfather      Stroke Maternal Grandfather      Hypertension Maternal Grandfather      Colon cancer Neg Hx      Ulcerative colitis Neg Hx      Stomach cancer Neg Hx      Esophageal cancer Neg Hx      Crohn's disease Neg Hx       Social History     Tobacco Use    Smoking status: Never    Smokeless tobacco: Never   Substance Use Topics    Alcohol use: Not Currently     Comment: rarely- not on weekly basis    Drug use: Yes     Frequency: 2.0 times per week     Types: Marijuana     Wt Readings from Last 10 Encounters:   05/27/24 108.3 kg (238 lb 12.1 oz)   05/21/24 105.1 kg (231 lb 11.3 oz)   01/04/24 110.8 kg (244 lb 3.2 oz)   09/14/23 117.6 kg (259 lb 4.8 oz)   08/31/23 119.2 kg (262 lb 11.2 oz)   08/09/23 119 kg (262 lb 6.4 oz)   08/02/23 120.2 kg (264 lb 14.4 oz)   07/25/23 120.2 kg (265 lb 1.6 oz)   01/10/22 128.4 kg (283 lb)   10/18/21 127.5 kg (281 lb)     Lab Results   Component Value Date    WBC 9.66 05/22/2024    HGB 11.7 (L) 05/22/2024    HCT 34.3 (L) 05/22/2024    MCV 88 05/22/2024     05/22/2024     CMP  Sodium   Date Value Ref Range Status   05/23/2024 137 136 - 145 mmol/L Final   07/10/2015 140 137 - 145 MMOL/L Final     Potassium   Date Value Ref Range Status   05/23/2024 3.6 3.5 - 5.1 mmol/L Final     Comment:     Anion Gap reference range revised on 4/28/2023   07/10/2015 4.1 3.5 - 5.1 MMOL/L Final     Chloride   Date Value Ref Range Status   05/23/2024 111 (H) 95 - 110 mmol/L Final   07/10/2015 102 98 - 107 MMOL/L Final     CO2   Date Value Ref Range Status   05/23/2024 23 22 - 31 mmol/L Final     Glucose   Date Value Ref Range Status   05/23/2024 89 70 - 110 mg/dL Final     Comment:     The ADA recommends the following guidelines for fasting glucose:    Normal:       less than 100  mg/dL    Prediabetes:  100 mg/dL to 125 mg/dL    Diabetes:     126 mg/dL or higher       BUN   Date Value Ref Range Status   05/23/2024 10 7 - 18 mg/dL Final     Creatinine   Date Value Ref Range Status   05/23/2024 0.92 0.50 - 1.40 mg/dL Final   07/10/2015 0.90 0.52 - 1.04 MG/DL Final     Calcium   Date Value Ref Range Status   05/23/2024 8.1 (L) 8.4 - 10.2 mg/dL Final     Total Protein   Date Value Ref Range Status   05/23/2024 5.8 (L) 6.0 - 8.4 g/dL Final     Albumin   Date Value Ref Range Status   05/23/2024 3.3 (L) 3.5 - 5.2 g/dL Final   07/10/2015 4.2 3.5 - 5.0 G/DL Final     Total Bilirubin   Date Value Ref Range Status   05/23/2024 0.8 0.2 - 1.3 mg/dL Final     Alkaline Phosphatase   Date Value Ref Range Status   05/23/2024 53 38 - 145 U/L Final     AST   Date Value Ref Range Status   05/23/2024 39 (H) 14 - 36 U/L Final     ALT   Date Value Ref Range Status   05/23/2024 37 (H) 0 - 35 U/L Final     Anion Gap   Date Value Ref Range Status   05/23/2024 3 (L) 5 - 12 mmol/L Final     Comment:     Anion Gap reference range revised on 4/28/2023     eGFR if    Date Value Ref Range Status   10/18/2021 >60.0 >60 mL/min/1.73 m^2 Final     eGFR if non    Date Value Ref Range Status   10/18/2021 >60.0 >60 mL/min/1.73 m^2 Final     Comment:     Calculation used to obtain the estimated glomerular filtration  rate (eGFR) is the CKD-EPI equation.        Lab Results   Component Value Date    LIPASERES 400 (H) 05/20/2024     Lab Results   Component Value Date    TSH 1.230 07/26/2023       Reviewed prior medical records including radiology report of 5/21/2024 limited abdominal ultrasound; 5/20/2024 CT abdomen pelvis; 5/22/2024 consult note from Dr. Rojas; & endoscopy history (see surgical history/procedures).    Objective:      Physical Exam  Vitals and nursing note reviewed.   Constitutional:       General: She is not in acute distress.     Appearance: Normal appearance. She is  well-developed. She is not diaphoretic.   HENT:      Mouth/Throat:      Lips: Pink. No lesions.      Mouth: Mucous membranes are moist. No oral lesions.      Tongue: No lesions.      Pharynx: Oropharynx is clear. No pharyngeal swelling or posterior oropharyngeal erythema.   Eyes:      General: No scleral icterus.     Conjunctiva/sclera: Conjunctivae normal.   Pulmonary:      Effort: Pulmonary effort is normal. No respiratory distress.      Breath sounds: Normal breath sounds. No wheezing.   Abdominal:      General: Bowel sounds are normal. There is no distension or abdominal bruit.      Palpations: Abdomen is soft. Abdomen is not rigid. There is no mass.      Tenderness: There is abdominal tenderness (mild) in the epigastric area and periumbilical area. There is no guarding or rebound.   Skin:     General: Skin is warm and dry.      Coloration: Skin is not jaundiced or pale.      Findings: No erythema or rash.   Neurological:      Mental Status: She is alert and oriented to person, place, and time.   Psychiatric:         Behavior: Behavior normal.         Thought Content: Thought content normal.         Judgment: Judgment normal.         Assessment:       1. Hospital discharge follow-up    2. History of esophagogastroduodenoscopy (EGD)    3. Hiatal hernia    4. Gastroesophageal reflux disease with esophagitis without hemorrhage    5. History of gastritis    6. Elevated lipase    7. Elevated LFTs    8. Epigastric pain    9. Periumbilical pain    10. Decreased appetite    11. Weight loss    12. Non-intractable vomiting with nausea    13. History of fibromyalgia    14. Intermittent diarrhea        Plan:       Hospital discharge follow-up  Recommend follow-up with Primary Care Provider for continued evaluation and management.    History of esophagogastroduodenoscopy (EGD)  - reviewed results with patient; recommend repeat EGD in 8-12 weeks  - schedule EGD to be done 8-12 weeks, discussed procedure with patient,  including risks and benefits, patient verbalized understanding    Hiatal hernia, Gastroesophageal reflux disease with esophagitis without hemorrhage, & History of gastritis  - REFILL    sucralfate (CARAFATE) 100 mg/mL suspension; Take 10 mLs (1 g total) by mouth 4 (four) times daily before meals and nightly.  Dispense: 1200 mL; Refill: 0  -  TAKE   pantoprazole (PROTONIX) 40 MG tablet; Take 1 tablet (40 mg total) by mouth 2 (two) times daily for 30 days, THEN 1 tablet (40 mg total) before breakfast.  Dispense: 90 tablet; Refill: 0  -discussed hiatal hernia diagnosis with patient & that it is usually managed by controlling reflux symptoms, surgery is an option, but usually performed if reflux is uncontrolled by medication management and lifestyle/dietary modifications; if symptoms persist despite medication management and lifestyle/dietary modifications, we can refer to general surgery to consult about surgical options, patient verbalized understanding    Elevated lipase  -     Lipase; Future; Expected date: 05/27/2024  -Stay well-hydrated, avoid alcohol & smoking, recommend high protein & low fat diet, & activity as tolerated. If no improvement in symptoms or symptoms worsen, call/follow-up at clinic or go to ER.    Elevated LFTs  -     Hepatic Function Panel; Future; Expected date: 05/27/2024  - minimize/avoid alcohol and tylenol products, & follow-up with PCP for continued evaluation and management; if specialist is needed, recommend seeing hepatology.    Epigastric pain & Periumbilical pain  - schedule EGD to be done 8-12 weeks, discussed procedure with patient, including risks and benefits, patient verbalized understanding  - avoid use of NSAIDs- since they can cause GI upset, bleeding and/or ulcers.  - REFILL    sucralfate (CARAFATE) 100 mg/mL suspension; Take 10 mLs (1 g total) by mouth 4 (four) times daily before meals and nightly.  Dispense: 1200 mL; Refill: 0  -  TAKE   pantoprazole (PROTONIX) 40 MG tablet;  Take 1 tablet (40 mg total) by mouth 2 (two) times daily for 30 days, THEN 1 tablet (40 mg total) before breakfast.  Dispense: 90 tablet; Refill: 0    Decreased appetite & Weight loss  -     IgA; Future; Expected date: 05/27/2024  -     Tissue Transglutaminase, IgA; Future; Expected date: 05/27/2024  - encouraged PO intake and daily calorie counts to ensure adequate nutrition is taken in, recommend at least 2,000 calories a day  - recommend nutritional drinks, such as Boost, Ensure or Glucerna, to supplement nutrition needs  - schedule EGD to be done 8-12 weeks, discussed procedure with patient, including risks and benefits, patient verbalized understanding  - Possible colonoscopy pending results of testing and if symptoms persist    Non-intractable vomiting with nausea  -     IgA; Future; Expected date: 05/27/2024  -     Tissue Transglutaminase, IgA; Future; Expected date: 05/27/2024  - schedule EGD to be done 8-12 weeks, discussed procedure with patient, including risks and benefits, patient verbalized understanding  - CONTINUE ZOFRAN PRN AS DIRECTED FOR NAUSEA  - avoid marijuana use    History of fibromyalgia  Recommend follow-up with Primary Care Provider for continued evaluation and management.    Intermittent diarrhea  -     Stool Exam-Ova,Cysts,Parasites; Future; Expected date: 05/27/2024  -     Giardia / Cryptosporidum, EIA; Future; Expected date: 05/27/2024  -     WBC, Stool; Future; Expected date: 05/27/2024  -     Stool culture; Future; Expected date: 05/27/2024  -     Clostridium difficile EIA; Future; Expected date: 05/27/2024  -     Occult blood x 1, stool; Future; Expected date: 05/27/2024  -     IgA; Future; Expected date: 05/27/2024  -     Tissue Transglutaminase, IgA; Future; Expected date: 05/27/2024  - recommend OTC probiotic, such as Florastor or Culturelle, taken as directed on packaging  - avoid lactose, alcohol, & caffeine  - avoid known triggers  - Possible colonoscopy pending results of  testing and if symptoms persist    Follow up in about 1 month (around 6/27/2024), or if symptoms worsen or fail to improve.      If no improvement in symptoms or symptoms worsen, call/follow-up at clinic or go to ER.        48 minutes of total time spent on the encounter, which includes face to face time and non-face to face time preparing to see the patient (e.g., review of tests), Obtaining and/or reviewing separately obtained history, Documenting clinical information in the electronic or other health record, Independently interpreting results (not separately reported) and communicating results to the patient/family/caregiver, or Care coordination (not separately reported).

## 2024-05-27 NOTE — Clinical Note
Please inform patient that I ordered some stool studies to further evaluate the intermittent diarrhea. Thanks RICHARD

## 2024-05-27 NOTE — PATIENT INSTRUCTIONS
"Severe Abdominal Pain   The Basics   Written by the doctors and editors at [facility]   Are there different types of abdominal pain? -- Yes. "Abdominal pain" means pain in the abdomen (or belly), which is the part of the body between the chest and the genital area. This pain can happen for different reasons. It can be "chronic," which means it develops over time, or "acute," which means it starts suddenly. It can be mild or severe. A person might feel the pain all over their abdomen, or only in 1 part.   Abdominal pain can feel different for different people. It can feel sharp or crampy, or dull and steady. Some people feel better if they curl into a ball, while others need to lie flat and completely still. People often feel sick to their stomach and retch or vomit.  Doctors use the term "acute abdomen" to describe an episode of severe abdominal pain that starts suddenly and lasts for a few hours or longer. It can cause pain so severe that the person has a hard time moving or breathing and it makes them want to go to the hospital or see their doctor or nurse right away. A true acute abdomen is a medical emergency.  What causes abdominal pain? -- Lots of different things can cause abdominal pain. When pain is less severe, it can be due to something like a virus or a stomach inflammation (called "gastritis").  Acute pain that is more severe can be caused by problems with 1 or more organs in the abdomen. Organs in the abdomen can be part of the digestive, urinary, or reproductive systems (figure 1 and figure 2 and figure 3).  Conditions that affect organs in the chest or genital area can also cause pain. Even though these organs aren't in the abdomen, people might still have abdominal pain.  Common causes of acute abdominal pain in adults include:  Appendicitis - Appendicitis is the term for when the appendix (a long, thin pouch that hangs down from the large intestine) gets infected and inflamed.  Diverticulitis - " Diverticulitis is an infection that develops in small pouches that can form in the intestine. This is common in older people.  Gallstones - Gallstones are small stones that form inside an organ called the gallbladder, which stores bile, a fluid that helps the body break down fat.  Abscess - An abscess is a collection of pus. An abscess can form in the abdomen, typically near the intestine.  Kidney stones - Kidney stones can form when salts and minerals that are normally in the urine build up and harden. They can cause pain when they pass through the ureters, which are the tubes that carry urine from the kidney to the bladder.  Bowel perforation - This is a hole in the bowel wall.  Perforated ulcer - This is a hole in the wall of the stomach or intestine.  Pancreatitis - This is the term for when the pancreas gets inflamed.  Ruptured cyst in the ovary - Cysts in the ovary are fluid-filled sacs that can form in some women. They sometimes rupture, which means that they break open and spill out.  Ectopic pregnancy - An ectopic pregnancy is a pregnancy that develops outside the uterus.  Should I see a doctor or nurse? -- Yes. If you have sudden or severe abdominal pain, call your doctor or nurse or go to the hospital right away. Depending on the cause of your pain, you might need immediate treatment.  Will I need tests? -- Probably. The doctor or nurse will ask about your symptoms, including where your pain is and what it feels like. The location of the pain can be an important clue to the cause.  Your doctor will ask about your current and past medical conditions, and do a physical exam. They might do repeat exams over time to follow your symptoms.  Your doctor will decide which tests you should have based on your symptoms and individual situation. The tests might include:  Blood tests  Urine tests  X-rays  An ultrasound, CT scan, or other imaging test - Imaging tests create pictures of the inside of the body.  How is  abdominal pain treated? -- Treatment depends on what's causing the pain. It might include 1 or more of the following:  Fluids given by IV  Pain medicines  Antibiotic medicines to treat an infection  Other medicines to treat other medical conditions  Surgery  All topics are updated as new evidence becomes available and our peer review process is complete.  This topic retrieved from [employer] on: Sep 21, 2021.  Topic 93311 Version 12.0  Release: 29.4.2 - C29.263  © 2021 UpToDate, Inc. and/or its affiliates. All rights reserved.  figure 1: Organs inside the abdomen (belly)     Graphic 55595 Version 6.0    figure 2: Anatomy of the urinary tract     Urine is made by the kidneys. It passes from the kidneys into the bladder through two tubes called the ureters. Then it leaves the bladder through another tube called the urethra.  Graphic 18491 Version 7.0    figure 3: Female reproductive anatomy     These are the internal organs that make up the female reproductive system.  Graphic 82281 Version 6.0    Consumer Information Use and Disclaimer   This information is not specific medical advice and does not replace information you receive from your health care provider. This is only a brief summary of general information. It does NOT include all information about conditions, illnesses, injuries, tests, procedures, treatments, therapies, discharge instructions or life-style choices that may apply to you. You must talk with your health care provider for complete information about your health and treatment options. This information should not be used to decide whether or not to accept your health care provider's advice, instructions or recommendations. Only your health care provider has the knowledge and training to provide advice that is right for you. The use of this information is governed by the [employer] End User License Agreement, available at https://[email or web address].Purple Binder/en/solutions/[employer]/about/nicol.The use of [employer]  content is governed by the [employer] Terms of Use. ©2021 UpToDate, Inc. All rights reserved.  Copyright   © 2021 UpToDate, Inc. and/or its affiliates. All rights reserved. Uncertain Causes of Diarrhea (Adult)    Diarrhea is when stools are loose and watery. This can be caused by:  Viral infections  Bacterial infections  Food poisoning  Parasites  Irritable bowel syndrome (IBS)  Inflammatory bowel diseases such as ulcerative colitis, Crohn's disease, and celiac disease  Food intolerance, such as to lactose, the sugar found in milk and milk products  Reaction to medicines like antibiotics, laxatives, cancer drugs, and antacids  Along with diarrhea, you may also have:  Abdominal pain and cramping  Nausea and vomiting  Loss of bowel control  Fever and chills  Bloody stools  In some cases, antibiotics may help to treat diarrhea. You may have a stool sample test. This is done to see what is causing your diarrhea, and if antibiotics will help treat it. The results of a stool sample test may take up to 2 days. The healthcare provider may not give you antibiotics until he or she has the stool test results.  Diarrhea can cause dehydration. This is the loss of too much water and other fluids from the body. When this occurs, body fluid must be replaced. This can be done with oral rehydration solutions. Oral rehydration solutions are available at drugstores and grocery stores without a prescription.  Home care  Follow all instructions given by your healthcare provider. Rest at home for the next 24 hours, or until you feel better. Avoid caffeine, tobacco, and alcohol. These can make diarrhea, cramping, and pain worse.  If taking medicines:  Dont take over-the-counter diarrhea or nausea medicines unless your healthcare provider tells you to.  You may use acetaminophen or NSAID medicines like ibuprofen or naproxen to reduce pain and fever. Dont use these if you have chronic liver or kidney disease, or ever had a stomach ulcer or  gastrointestinal bleeding. Don't use NSAID medicines if you are already taking one for another condition (like arthritis) or are on daily aspirin therapy (such as for heart disease or after a stroke). Talk with your healthcare provider first.  If antibiotics were prescribed, be sure you take them until they are finished. Dont stop taking them even when you feel better. Antibiotics must be taken as a full course.  To prevent the spread of illness:  Remember that washing with soap and water and using alcohol-based  is the best way to prevent the spread of infection.  Clean the toilet after each use.  Wash your hands before eating.  Wash your hands before and after preparing food. Keep in mind that people with diarrhea or vomiting should not prepare food for others.  Wash your hands after using cutting boards, countertops, and knives that have been in contact with raw foods.  Wash and then peel fruits and vegetables.  Keep uncooked meats away from cooked and ready-to-eat foods.  Use a food thermometer when cooking. Cook poultry to at least 165°F (74°C). Cook ground meat (beef, veal, pork, lamb) to at least 160°F (71°C). Cook fresh beef, veal, lamb, and pork to at least 145°F (63°C).  Dont eat raw or undercooked eggs (poached or clara side up), poultry, meat, or unpasteurized milk and juices.  Food and drinks  The main goal while treating vomiting or diarrhea is to prevent dehydration. This is done by taking small amounts of liquids often.  Keep in mind that liquids are more important than food right now.  Drink only small amounts of liquids at a time.  Dont force yourself to eat, especially if you are having cramping, vomiting, or diarrhea. Dont eat large amounts at a time, even if you are hungry.  If you eat, avoid fatty, greasy, spicy, or fried foods.  Dont eat dairy foods or drink milk if you have diarrhea. These can make diarrhea worse.  During the first 24 hours you can try:  Oral rehydration  solutions. Do not use sports drinks. They have too much sugar and not enough electrolytes.  Soft drinks without caffeine  Ginger ale  Water (plain or flavored)  Decaf tea or coffee  Clear broth, consommé, or bouillon  Gelatin, popsicles, or frozen fruit juice bars  The second 24 hours, if you are feeling better, you can add:  Hot cereal, plain toast, bread, rolls, or crackers  Plain noodles, rice, mashed potatoes, chicken noodle soup, or rice soup  Unsweetened canned fruit (no pineapple)  Bananas  As you recover:  Limit fat intake to less than 15 grams per day. Dont eat margarine, butter, oils, mayonnaise, sauces, gravies, fried foods, peanut butter, meat, poultry, or fish.  Limit fiber. Dont eat raw or cooked vegetables, fresh fruits except bananas, or bran cereals.  Limit caffeine and chocolate.  Limit dairy.  Dont use spices or seasonings except salt.  Go back to your normal diet over time, as you feel better and your symptoms improve.  If the symptoms come back, go back to a simple diet or clear liquids.  Follow-up care  Follow up with your healthcare provider, or as advised. If a stool sample was taken or cultures were done, call the healthcare provider for the results as instructed.  Call 911  Call 911 if you have any of these symptoms:  Trouble breathing  Confusion  Extreme drowsiness or trouble walking  Loss of consciousness  Rapid heart rate  Chest pain  Stiff neck  Seizure  When to seek medical advice  Call your healthcare provider right away if any of these occur:  Abdominal pain that gets worse  Constant lower right abdominal pain  Continued vomiting and inability to keep liquids down  Diarrhea more than 5 times a day  Blood in vomit or stool  Dark urine or no urine for 8 hours, dry mouth and tongue, tiredness, weakness, or dizziness  Drowsiness  New rash  You dont get better in 2 to 3 days  Fever of 100.4°F (38°C) or higher that doesnt get lower with medicine  Date Last Reviewed: 1/3/2016  ©  "0207-2386 [employer]. [address] [place] [address], [place] [address]. All rights reserved. This information is not intended as a substitute for professional medical care. Always follow your healthcare professional's instructions.         Gastritis   The Basics   Written by the doctors and editors at [facility]   What is gastritis? -- "Gastritis" means inflammation of the stomach lining (figure 1).  Some people have gastritis that comes on suddenly and lasts only for a short time. Doctors call this "acute" gastritis. Other people have gastritis that lasts for months or years. Doctors call this "chronic" gastritis.  What causes gastritis? -- Different things can cause gastritis, including:  An infection in the stomach from bacteria called "H. pylori"  Medicines called "nonsteroidal antiinflammatory drugs" (NSAIDs) - These include aspirin, ibuprofen (brand names: Advil, Motrin), and naproxen (brand names: Aleve, Naprosyn).  Drinking alcohol  Conditions in which the body's infection-fighting system attacks the stomach lining  Having a serious or life-threatening illness  What are the symptoms of gastritis? -- People with gastritis have no symptoms. When people do have symptoms, they are due to other conditions that can happen with gastritis, like ulcers. Symptoms from ulcers include:  Pain in the upper belly  Feeling bloated, or feeling full after eating a small amount of food  Decreased appetite  Nausea or vomiting  Vomiting blood, or having black-colored bowel movements  Feeling more tired than usual - This happens if people with gastritis get a condition called "anemia."  Should I call my doctor or nurse? -- Call your doctor or nurse if:  You have belly pain that gets worse or doesn't go away  You vomit blood or have black bowel movements  You are losing weight (without trying to)  Will I need tests? -- Probably. Your doctor or nurse will ask about your symptoms and do an exam. They might also do:  An upper " endoscopy - During this procedure, the doctor puts a thin tube with a camera on the end into your mouth and down into your stomach (figure 2). they will look at the inside of your stomach. During the procedure, they might also do a test called a biopsy. For a biopsy, the doctor takes a small sample of the stomach lining. Then another doctor looks at the sample under a microscope.  Tests to check for H. pylori infection. These can include:  Blood tests  Breath tests - These tests measure substances in your breath after you drink a special liquid.  Tests on a small sample of your bowel movement  Blood tests to check for anemia  How is gastritis treated? -- Treatment depends on what's causing your gastritis.  For example, if NSAIDs are causing your gastritis, your doctor will recommend that you not take those medicines. If alcohol is causing your gastritis, they will recommend that you stop drinking alcohol.  Doctors can use medicines to treat gastritis caused by an H. pylori infection. Most people take 3 or more medicines for 2 weeks. The treatment includes antibiotics plus medicine that helps the stomach make less acid.  Doctors can use medicines that reduce or block stomach acid to treat other causes of gastritis (table 1). The main types of medicines that reduce or block stomach acid are:  Antacids  Surface agents  Histamine blockers  Proton pump inhibitors  If your doctor recommends acid-reducing treatment, they will tell you which medicine to use.  What happens after treatment? -- Sometimes, people who are treated for an H. pylori infection need follow-up tests to make sure the infection is gone. Follow-up tests include breath tests, lab tests on a sample of bowel movement, or endoscopy.  All topics are updated as new evidence becomes available and our peer review process is complete.  This topic retrieved from [employer] on: Sep 21, 2021.  Topic 51647 Version 8.0  Release: 29.4.2 - C29.263  © 2021 UpToDate, Inc.  and/or its affiliates. All rights reserved.  figure 1: Upper digestive tract     The upper digestive tract includes the esophagus (the tube that connects the mouth to the stomach), the stomach, and the duodenum (the first part of the small intestine).  Graphic 45457 Version 6.0    figure 2: Upper endoscopy     During an upper endoscopy, you lie down and the doctor puts a thin tube with a camera and light on the end (called an endoscope) into your mouth and down into your esophagus, stomach, and duodenum (the first part of your small intestine). The camera sends pictures from inside your body to a television screen. That way, your doctor can see the inside of your esophagus, stomach, and duodenum.  Graphic 83206 Version 4.0    table 1: Medicines used to reduce stomach acid  Medicine type  Medicine name examples    Antacids* Calcium carbonate (sample brand names: Maalox, Tums)    Aluminum hydroxide, magnesium hydroxide, and simethicone (sample brand name: Mylanta)   Surface agents Sucralfate (brand name: Carafate)   Histamine blockers¶  Famotidine (brand name: Pepcid)    Cimetidine (brand name: Tagamet)   Proton pump inhibitors Omeprazole (brand name: Prilosec)    Esomeprazole (brand name: Nexium)    Pantoprazole (brand name: Protonix)    Lansoprazole (brand name: Prevacid)    Dexlansoprazole (brand name: Dexilant)    Rabeprazole (brand name: AcipHex)   Graphic 89185 Version 14.0  Consumer Information Use and Disclaimer   This information is not specific medical advice and does not replace information you receive from your health care provider. This is only a brief summary of general information. It does NOT include all information about conditions, illnesses, injuries, tests, procedures, treatments, therapies, discharge instructions or life-style choices that may apply to you. You must talk with your health care provider for complete information about your health and treatment options. This information should not be  used to decide whether or not to accept your health care provider's advice, instructions or recommendations. Only your health care provider has the knowledge and training to provide advice that is right for you. The use of this information is governed by the [employer] End User License Agreement, available at https://www.BangTango/en/solutions/[employer]/about/nicol.The use of [employer] content is governed by the [employer] Terms of Use. ©2021 UpToDate, Inc. All rights reserved.  Copyright   © 2021 UpToDate, Inc. and/or its affiliates. All rights reserved.      Gastritis Discharge Instructions   About this topic   Gastritis is inflammation of the lining of the stomach. Sometimes gastritis is caused by bacteria. Other times, it can be caused by drugs. Some types of drugs can cause gastritis. The most common are nonsteroidal anti-inflammatory drugs (NSAIDs) like ibuprofen or naproxen. Gastritis can also be caused by other things like drinking alcohol or having a serious illness. It can also happen if you have a health problem in which the bodys own infection fighting system attacks the stomach lining. Based on the cause, you may need to take an antibiotic or other medicine to treat your gastritis. If so, be sure you finish all of the medicine that is ordered.     What care is needed at home?   Ask your doctor what you need to do when you go home. Make sure you ask questions if you do not understand what the doctor says.  Eat small meals more often to help with belly pain.  Keep a diary about your pain and the foods you eat. Then you can avoid those that bother your stomach.  Avoid or limit spicy foods.  Avoid or limit beer, wine, and mixed drinks.  If you smoke, try to quit. Your doctor or nurse can help.  Try to learn ways to manage stress. Stress may cause the acid levels in your stomach to rise.  If possible, avoid long-term use of aspirin and other anti-inflammatory drugs.  What follow-up care is needed?   Your  doctor may ask you to make visits to the office to check on your progress. Be sure to keep these visits.  What drugs may be needed?   The doctor may order drugs to:  Fight an infection  Control how much acid your stomach makes  Help healing  Will physical activity be limited?   You may want to limit your activity if you have belly pain. Having an upset stomach or throwing up may also limit what you do. You may need more rest if you feel weak or tired.  What problems could happen?   Stomach ulcer or bleeding  Stomach cancer  When do I need to call the doctor?   You start throwing up blood or pass a lot of blood in your stool.  Your belly pain becomes much worse all of a sudden or over a few hours.  Your belly becomes hard or tender.  You have chest pain or trouble breathing  Your stools are bright red, black, or tar-colored.  You are throwing up often.  Your belly pain does not get better even after taking medicine, changing your diet, and following treatment instructions.  You lose a lot of weight without trying.  Teach Back: Helping You Understand   The Teach Back Method helps you understand the information we are giving you. After you talk with the staff, tell them in your own words what you learned. This helps to make sure the staff has described each thing clearly. It also helps to explain things that may have been confusing. Before going home, make sure you can do these:  I can tell you about my condition.  I can tell you if I need to make changes with my diet or drugs.  I can tell you what I will do if I throw up blood or have bloody or black tarry stools.  Where can I learn more?   [facility] [place]  https://www.nhs.[place]/conditions/gastritis/   Last Reviewed Date   2021-06-08  Consumer Information Use and Disclaimer   This information is not specific medical advice and does not replace information you receive from your health care provider. This is only a brief summary of general information. It  does NOT include all information about conditions, illnesses, injuries, tests, procedures, treatments, therapies, discharge instructions or life-style choices that may apply to you. You must talk with your health care provider for complete information about your health and treatment options. This information should not be used to decide whether or not to accept your health care providers advice, instructions or recommendations. Only your health care provider has the knowledge and training to provide advice that is right for you.  Copyright   Copyright © 2021 UpToDate, Inc. and its affiliates and/or licensors. All rights reserved. Hiatal Hernia   The Basics   Written by the doctors and editors at [facility]   What is a hiatal hernia? -- A hiatal hernia is what doctors call it when a part of the stomach moves up into the chest area. Normally, the stomach sits below the diaphragm, the layer of muscle that separates the organs in the chest from the organs in the belly. The esophagus, the tube that carries food from the mouth to the stomach, passes through a hole in the diaphragm. In people with a hiatal hernia, the stomach pushes up through that hole, too.  There are 2 types of hiatal hernia (figure 1):  Sliding hernia - A sliding hernia happens when the top of the stomach and the lower part of the esophagus squeeze up into the space above the diaphragm. This is the most common type of hiatal hernia.  Paraesophageal hernia - A paraesophageal hernia happens when the top of the stomach squeezes up into the space above the diaphragm. This is not very common, but it can be serious if the stomach folds up on itself. It can also cause bleeding from the stomach or trouble breathing.  What are the symptoms of a hiatal hernia? -- Hiatal hernias do not usually cause symptoms. In some cases, though, hiatal hernias cause stomach acid to leak into the esophagus. This is called acid reflux or gastroesophageal reflux, and it can cause  symptoms, including:  Burning in the chest, known as heartburn  Burning in the throat or an acid taste in the throat  Stomach or chest pain  Trouble swallowing  A raspy voice or a sore throat  Unexplained cough  Is there a test for hiatal hernia? -- Yes, but doctors do not usually test for hiatal hernia. Instead, most people learn they have a hiatal hernia when they are having tests to find the cause of symptoms, or for other reasons. For instance, some people find out they have a hiatal hernia when they have an X-ray. Others find out when their doctor puts a tube with a tiny camera down their throat (called an endoscopy) (figure 2).  How are hiatal hernias treated? -- People who have symptoms caused by a hiatal hernia can get treated for their symptoms.  Treatment for symptoms involves taking the medicines that are used for acid reflux (table 1). People with a paraesophageal hernia, and some people with a sliding hernia, need surgery. For this surgery, the surgeon pulls the stomach back down and repairs the hole in the diaphragm so the stomach does not slide up again.  All topics are updated as new evidence becomes available and our peer review process is complete.  This topic retrieved from [employer] on: Sep 21, 2021.  Topic 44908 Version 8.0  Release: 29.4.2 - C29.263  © 2021 UpToDate, Inc. and/or its affiliates. All rights reserved.  figure 1: Hiatal hernia     A sliding hernia happens when the top of the stomach squeezes up into the space above the diaphragm. This is the most common type of hiatal hernia.  A paraesophageal hernia happens when the top of the stomach folds up against the esophagus, creating a pouch. This is not very common, but it can be serious.  Graphic 12329 Version 4.0    figure 2: Upper endoscopy     During an upper endoscopy, you lie down and the doctor puts a thin tube with a camera and light on the end (called an endoscope) into your mouth and down into your esophagus, stomach, and  duodenum (the first part of your small intestine). The camera sends pictures from inside your body to a television screen. That way, your doctor can see the inside of your esophagus, stomach, and duodenum.  Graphic 15935 Version 4.0    table 1: Medicines used to reduce stomach acid  Medicine type  Medicine name examples    Antacids* Calcium carbonate (sample brand names: Maalox, Tums)    Aluminum hydroxide, magnesium hydroxide, and simethicone (sample brand name: Mylanta)   Surface agents Sucralfate (brand name: Carafate)   Histamine blockers¶  Famotidine (brand name: Pepcid)    Cimetidine (brand name: Tagamet)   Proton pump inhibitors Omeprazole (brand name: Prilosec)    Esomeprazole (brand name: Nexium)    Pantoprazole (brand name: Protonix)    Lansoprazole (brand name: Prevacid)    Dexlansoprazole (brand name: Dexilant)    Rabeprazole (brand name: AcipHex)   Graphic 73966 Version 14.0  Consumer Information Use and Disclaimer   This information is not specific medical advice and does not replace information you receive from your health care provider. This is only a brief summary of general information. It does NOT include all information about conditions, illnesses, injuries, tests, procedures, treatments, therapies, discharge instructions or life-style choices that may apply to you. You must talk with your health care provider for complete information about your health and treatment options. This information should not be used to decide whether or not to accept your health care provider's advice, instructions or recommendations. Only your health care provider has the knowledge and training to provide advice that is right for you. The use of this information is governed by the [employer] End User License Agreement, available at https://www.[employer].adSage/en/solutions/[employer]/about/nicol.The use of [employer] content is governed by the [employer] Terms of Use. ©2021 UpToDate, Inc. All rights reserved.  Copyright   ©  2021 UpToDate, Inc. and/or its affiliates. All rights reserved. Acid Reflux and GERD in Adults Discharge Instructions   About this topic   GERD stands for gastroesophageal reflux disease. It is sometimes called reflux or acid reflux. Acid reflux happens when your stomach acid backs up into your esophagus, the tube that carries your food from your mouth to your stomach. This can be uncomfortable. You may have stomach or chest pain (heartburn), trouble swallowing, or an upset stomach. Some people have a cough or sore throat.  Most of the time, you can use over-the-counter medicines to help with this problem.       What care is needed at home?   Ask your doctor what you need to do when you go home. Make sure you ask questions if you do not understand what the doctor says.  Raise the head of your bed by 6 to 8 inches (15 to 20 cm). Use wood or rubber blocks under 2 legs or try a foam wedge under your mattress. Just sleeping with your head raised on pillows is not enough.  Avoid beer, wine, and mixed drinks and avoid caffeine.  Keep a healthy weight. If you are too heavy, lose weight.  If you smoke, try to quit. Your doctor or nurse can help.  Keep a diary of your signs. Write down what you had to eat before you had reflux. This will help you learn which foods cause you problems. For some people, they need to avoid coffee, chocolate, alcohol, spicy or fatty foods, or peppermint.  Avoid eating for 2 to 3 hours before bedtime. Lying down after you eat can make reflux worse.  Avoid belts and clothes that are too tight.  What follow-up care is needed?   Your doctor may ask you to make visits to the office to check on your progress. Be sure to keep these visits.  What drugs may be needed?   The doctor may order drugs to:  Relieve heartburn  Prevent reflux  Lessen acid production  Heal the esophageal lining  Will physical activity be limited?   Your physical activities will not be limited.  What problems could happen?    Asthma  Precancerous changes in the food pipe  Long-term cough  Dental problems  Higher risk of cancer of the food pipe. This is esophageal cancer.  Narrowing of the food pipe. This is a stricture.  Open sore in the food pipe. This is an ulcer.  When do I need to call the doctor?   You have signs of a heart attack, which may include:  Severe chest pain, pressure, or discomfort with:  Breathing trouble, sweating, upset stomach, or cold, clammy skin.  Pain in your arms, back, or jaw.  Worse pain with activity like walking up stairs.  Fast or irregular heartbeat.  Feeling dizzy, faint, or weak.  You have sudden, severe belly pain or the belly pain is constant.  You have blood in the undigested food and acid that comes up, or stool that looks red, black, or like tar.  You feel like your food gets stuck or you have pain when you swallow.  You lose weight when you are not trying to.  You choke when you are eating.  Your reflux is very bad, very frequent, or not helped by over-the-counter medicines.  You keep throwing up.  Teach Back: Helping You Understand   The Teach Back Method helps you understand the information we are giving you. After you talk with the staff, tell them in your own words what you learned. This helps to make sure the staff has described each thing clearly. It also helps to explain things that may have been confusing. Before going home, make sure you can do these:  I can tell you about my condition.  I can tell you what changes I need to make with my eating habits to ease the reflux.  I can tell you what I will do if I am throwing up fluid that looks like blood or coffee grounds.  Where can I learn more?   American Academy of Family Physicians  https://familydoctor.org/condition/refluxacid-reflux/   NHS Choices  https://www.nhs.uk/conditions/heartburn-and-acid-reflux/   Last Reviewed Date   2021-06-09  Consumer Information Use and Disclaimer   This information is not specific medical advice and does  not replace information you receive from your health care provider. This is only a brief summary of general information. It does NOT include all information about conditions, illnesses, injuries, tests, procedures, treatments, therapies, discharge instructions or life-style choices that may apply to you. You must talk with your health care provider for complete information about your health and treatment options. This information should not be used to decide whether or not to accept your health care providers advice, instructions or recommendations. Only your health care provider has the knowledge and training to provide advice that is right for you.  Copyright   Copyright © 2021 [employer] and its affiliates and/or licensors. All rights reserved.

## 2024-05-28 ENCOUNTER — TELEPHONE (OUTPATIENT)
Dept: GASTROENTEROLOGY | Facility: CLINIC | Age: 29
End: 2024-05-28
Payer: COMMERCIAL

## 2024-05-28 PROBLEM — R74.01 TRANSAMINITIS: Status: ACTIVE | Noted: 2024-05-28

## 2024-05-28 PROBLEM — D72.829 LEUCOCYTOSIS: Status: ACTIVE | Noted: 2024-05-28

## 2024-05-28 NOTE — TELEPHONE ENCOUNTER
----- Message from LANETTE Rose sent at 5/27/2024 12:39 PM CDT -----  Please inform patient that I ordered some stool studies to further evaluate the intermittent diarrhea.  Thanks  RICHARD

## 2024-05-28 NOTE — TELEPHONE ENCOUNTER
Please call to inform & review the results with the patient- labs from yesterday showed elevated pancreatic enzyme (lipase) and some of the liver function levels were elevated. I see patient is currently hospitalized and last lipase level is back within normal levels. I see the most current liver function levels are still elevated. Follow the hospitalist recommendations and follow-up with us 1-2 weeks after hospital discharge.    Continue with previous recommendations. If no improvement in symptoms or symptoms worsen, call/follow-up at clinic or go to ER.    Thanks,

## 2024-05-28 NOTE — TELEPHONE ENCOUNTER
Spoke with patient she was notified of the new orders and that she can  a stool specimen kit at the lab desk.

## 2024-06-01 PROBLEM — K82.8 BILIARY DYSKINESIA: Status: ACTIVE | Noted: 2024-06-01

## 2024-07-11 DIAGNOSIS — K21.00 GASTROESOPHAGEAL REFLUX DISEASE WITH ESOPHAGITIS WITHOUT HEMORRHAGE: ICD-10-CM

## 2024-07-11 DIAGNOSIS — K44.9 HIATAL HERNIA: Primary | ICD-10-CM

## 2024-07-11 RX ORDER — PANTOPRAZOLE SODIUM 40 MG/1
40 TABLET, DELAYED RELEASE ORAL DAILY
Qty: 30 TABLET | Refills: 0 | Status: SHIPPED | OUTPATIENT
Start: 2024-07-11 | End: 2024-08-10

## 2024-07-11 RX ORDER — PANTOPRAZOLE SODIUM 40 MG/1
TABLET, DELAYED RELEASE ORAL
Qty: 90 TABLET | OUTPATIENT
Start: 2024-07-11

## 2024-07-12 DIAGNOSIS — K21.00 GASTROESOPHAGEAL REFLUX DISEASE WITH ESOPHAGITIS WITHOUT HEMORRHAGE: ICD-10-CM

## 2024-07-12 DIAGNOSIS — K44.9 HIATAL HERNIA: ICD-10-CM

## 2024-07-12 DIAGNOSIS — Z87.19 HISTORY OF GASTRITIS: ICD-10-CM

## 2024-07-12 RX ORDER — SUCRALFATE 1 G/10ML
1 SUSPENSION ORAL
Refills: 0 | OUTPATIENT
Start: 2024-07-12 | End: 2024-08-11

## 2024-07-26 PROBLEM — R10.9 ABDOMINAL PAIN: Status: ACTIVE | Noted: 2024-07-26

## 2024-07-26 PROBLEM — E78.5 HLD (HYPERLIPIDEMIA): Status: ACTIVE | Noted: 2024-07-26

## 2024-07-26 PROBLEM — E66.812 CLASS 2 OBESITY: Status: ACTIVE | Noted: 2024-07-26

## 2024-07-26 PROBLEM — E66.9 CLASS 2 OBESITY: Status: ACTIVE | Noted: 2024-07-26

## 2024-07-26 PROBLEM — F90.9 ADHD: Status: ACTIVE | Noted: 2023-11-06

## 2024-07-26 PROBLEM — E87.20 LACTIC ACIDOSIS: Status: ACTIVE | Noted: 2024-07-26

## 2024-07-29 ENCOUNTER — TELEPHONE (OUTPATIENT)
Dept: GASTROENTEROLOGY | Facility: CLINIC | Age: 29
End: 2024-07-29
Payer: COMMERCIAL

## 2024-07-29 NOTE — TELEPHONE ENCOUNTER
Left message for pt informing her upcoming procedure has been canceled & to call back to reschedule at her convenience. Number provided.

## 2024-07-29 NOTE — TELEPHONE ENCOUNTER
----- Message from Silva Menendez sent at 7/29/2024  9:59 AM CDT -----  Type: Needs Medical Advice  Who Called:  pt  Pharmacy name and phone #:    Best Call Back Number: [phone]  Additional Information: pt is calling the office to cancel upcoming ENDO on 8/8/24/ please call pt back to cancel and reschdule

## 2024-08-05 ENCOUNTER — PATIENT MESSAGE (OUTPATIENT)
Dept: PSYCHIATRY | Facility: CLINIC | Age: 29
End: 2024-08-05
Payer: COMMERCIAL

## 2024-08-07 ENCOUNTER — PATIENT MESSAGE (OUTPATIENT)
Dept: PSYCHIATRY | Facility: CLINIC | Age: 29
End: 2024-08-07
Payer: COMMERCIAL

## 2024-09-25 ENCOUNTER — OFFICE VISIT (OUTPATIENT)
Dept: OBSTETRICS AND GYNECOLOGY | Facility: CLINIC | Age: 29
End: 2024-09-25
Payer: COMMERCIAL

## 2024-09-25 VITALS
DIASTOLIC BLOOD PRESSURE: 68 MMHG | WEIGHT: 231.94 LBS | SYSTOLIC BLOOD PRESSURE: 114 MMHG | BODY MASS INDEX: 38.64 KG/M2 | HEIGHT: 65 IN

## 2024-09-25 DIAGNOSIS — Z01.419 ENCOUNTER FOR GYNECOLOGICAL EXAMINATION: Primary | ICD-10-CM

## 2024-09-25 DIAGNOSIS — Z12.4 ENCOUNTER FOR SCREENING FOR CERVICAL CANCER: ICD-10-CM

## 2024-09-25 DIAGNOSIS — Z11.3 SCREENING EXAMINATION FOR STD (SEXUALLY TRANSMITTED DISEASE): ICD-10-CM

## 2024-09-25 PROCEDURE — 99999 PR PBB SHADOW E&M-EST. PATIENT-LVL III: CPT | Mod: PBBFAC,,, | Performed by: STUDENT IN AN ORGANIZED HEALTH CARE EDUCATION/TRAINING PROGRAM

## 2024-09-25 PROCEDURE — 87491 CHLMYD TRACH DNA AMP PROBE: CPT | Performed by: STUDENT IN AN ORGANIZED HEALTH CARE EDUCATION/TRAINING PROGRAM

## 2024-09-25 PROCEDURE — 99385 PREV VISIT NEW AGE 18-39: CPT | Mod: S$GLB,,, | Performed by: STUDENT IN AN ORGANIZED HEALTH CARE EDUCATION/TRAINING PROGRAM

## 2024-09-25 PROCEDURE — 88175 CYTOPATH C/V AUTO FLUID REDO: CPT | Performed by: STUDENT IN AN ORGANIZED HEALTH CARE EDUCATION/TRAINING PROGRAM

## 2024-09-25 RX ORDER — PROMETHAZINE HYDROCHLORIDE 25 MG/1
SUPPOSITORY RECTAL
COMMUNITY

## 2024-09-25 RX ORDER — ONDANSETRON 4 MG/1
50 TABLET, ORALLY DISINTEGRATING ORAL
COMMUNITY
Start: 2024-07-31

## 2024-09-25 NOTE — PROGRESS NOTES
"Chief Complaint: Well Woman Exam     HPI:      Georgie Dunlap is a 29 y.o.  who presents today for well woman exam.  LMP: No LMP recorded (lmp unknown).   H/o PCOS -  h/o DVT with OCPs, tried depo provera and micronor but did not like side effects.  Today patient's GYN complaints include: {LFBGYNCC:01346}.  Specifically, patient denies abnormal vaginal bleeding, discharge, pelvic pain, urinary problems.    Ms. Dunlap {Blank single:::"has never been sexually active","is not currently sexually active","is currently sexually active with multiple partners","is currently sexually active with a single female partner","is currently sexually active with a single male partner"}. She is currently using {[facility] Contraception List:91183} for contraception. She {Blank single:::"declines","would like"} STD screening today.    Previous Pap: {BCSLPapResults:45286::"NILM, HPV negative"} (No result found)  Previous Mammogram: BiRads: {NUMBER 1-5:54862} T-C Score: *** (***) No results found for this or any previous visit.       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    PCOS (polycystic ovarian syndrome)     Vitamin D insufficiency        Past Surgical History:   Procedure Laterality Date    ESOPHAGOGASTRODUODENOSCOPY N/A 2024    Procedure: EGD (ESOPHAGOGASTRODUODENOSCOPY);  Surgeon: Ean Rojas MD;  [facility];  Service: " Gastroenterology;  Laterality: N/A;    LAPAROSCOPIC CHOLECYSTECTOMY N/A 5/31/2024    Procedure: CHOLECYSTECTOMY, LAPAROSCOPIC;  Surgeon: Heriberto Motta MD;  Location[facility];  Service: General;  Laterality: N/A;    TONSILLECTOMY  07/01/2014       Social History     Socioeconomic History    Marital status:    Tobacco Use    Smoking status: Never    Smokeless tobacco: Never   Substance and Sexual Activity    Alcohol use: Not Currently     Comment: rarely- not on weekly basis    Drug use: Yes     Frequency: 2.0 times per week     Types: Marijuana    Sexual activity: Yes     Partners: Male     Birth control/protection: None     Comment: marrued     Social Determinants of Health     Financial Resource Strain: Patient Declined (1/4/2024)    Overall Financial Resource Strain (CARDIA)     Difficulty of Paying Living Expenses: Patient declined   Food Insecurity: No Food Insecurity (7/26/2024)    Hunger Vital Sign     Worried About Running Out of Food in the Last Year: Never true     Ran Out of Food in the Last Year: Never true   Transportation Needs: No Transportation Needs (7/26/2024)    TRANSPORTATION NEEDS     Transportation : No   Physical Activity: Insufficiently Active (1/4/2024)    Exercise Vital Sign     Days of Exercise per Week: 2 days     Minutes of Exercise per Session: 30 min   Stress: Stress Concern Present (1/4/2024)    [employer] - Occupational Stress Questionnaire     Feeling of Stress : Rather much   Housing Stability: Low Risk  (7/26/2024)    Housing Stability Vital Sign     Unable to Pay for Housing in the Last Year: No     Homeless in the Last Year: No       Family History   Problem Relation Name Age of Onset    Migraines Mother      Diverticulitis Father      Stroke Maternal Grandmother      Migraines Maternal Grandmother      Gallbladder disease Maternal Grandmother      Hearing loss Maternal Grandfather      Stroke Maternal Grandfather      Hypertension Maternal  "Grandfather      Colon cancer Neg Hx      Ulcerative colitis Neg Hx      Stomach cancer Neg Hx      Esophageal cancer Neg Hx      Crohn's disease Neg Hx         Review of patient's allergies indicates:   Allergen Reactions    Latex, natural rubber     Plum     Sulfa (sulfonamide antibiotics)        OB History          0    Para   0    Term   0       0    AB   0    Living   0         SAB   0    IAB   0    Ectopic   0    Multiple   0    Live Births   0                 Physical Exam:      PHYSICAL EXAM:  /68 (BP Location: Left arm, Patient Position: Sitting, BP Method: Medium (Manual))   Ht 5' 5" (1.651 m)   Wt 105.2 kg (231 lb 14.8 oz)   LMP  (LMP Unknown)   BMI 38.59 kg/m²   Body mass index is 38.59 kg/m².     APPEARANCE: Well nourished, well developed, in no acute distress.  PSYCH: Appropriate mood and affect.  SKIN: No acne or hirsutism  NECK: Neck symmetric without masses  NODES: No inguinal, axillary, or supraclavicular lymph node enlargement  ABDOMEN: Soft.  No tenderness or masses.    CARDIOVASCULAR: No edema of peripheral extremities  BREASTS: Symmetrical, {Blank single:53503::"well healed surgical scars","no visible skin lesions"}. {Blank single:13114::"Implants symmetric without defects noted","Lumpy bumpy feel to bilateral breasts","No palpable masses"}. No nipple discharge bilaterally.  PELVIC: Normal external genitalia without lesions.  Normal hair distribution.  Adequate perineal body, normal urethral meatus.  Vagina {Blank single:22484::"moist and smooth","moist and well rugated"}. Without lesions. {WITH-WITHOUT:99062}discharge.  {Blank single:11441::"Normal appearing vaginal cuff","Cervix pink, without lesions, discharge or tenderness"}.  No significant cystocele or rectocele.  Bimanual exam shows {Blank single:75661::"no midline or adnexal masses","uterus to be normal size, regular, mobile and nontender.  Adnexa without masses or tenderness"}.      Assessment/Plan: " "    Encounter for screening for cervical cancer  -     Liquid-Based Pap Smear, Screening    Screening examination for STD (sexually transmitted disease)  -     C. trachomatis/N. gonorrhoeae by AMP DNA [facility]; Cervicovaginal        No follow-ups on file.    Counseling:     Patient was counseled today on current ASCCP pap guidelines, the recommendation for yearly physical exams, safe driving habits, {Blank multiple:68718::"breast self awareness","annual mammograms"}. She is to see her PCP for other health maintenance.     Use of the [facility] Patient Portal discussed and encouraged during today's visit.   "

## 2024-09-27 LAB
C TRACH DNA SPEC QL NAA+PROBE: NOT DETECTED
N GONORRHOEA DNA SPEC QL NAA+PROBE: NOT DETECTED

## 2024-10-02 LAB
FINAL PATHOLOGIC DIAGNOSIS: NORMAL
Lab: NORMAL

## 2024-10-07 ENCOUNTER — TELEPHONE (OUTPATIENT)
Dept: OBSTETRICS AND GYNECOLOGY | Facility: CLINIC | Age: 29
End: 2024-10-07

## 2024-10-07 ENCOUNTER — TELEPHONE (OUTPATIENT)
Dept: OBSTETRICS AND GYNECOLOGY | Facility: CLINIC | Age: 29
End: 2024-10-07
Payer: COMMERCIAL

## 2024-12-09 DIAGNOSIS — M25.562 LEFT KNEE PAIN, UNSPECIFIED CHRONICITY: Primary | ICD-10-CM

## 2024-12-09 NOTE — PROGRESS NOTES
"Subjective:       Patient ID: Georgie Dunlap is a 29 y.o. female Body mass index is 37.2 kg/m².    Chief Complaint: No chief complaint on file.    Referring Provider: Dr. Savi Ji* for nausea, GERD, & gastroparesis.  Established patient of Dr. Rojas & myself.     Patient did not complete EGD as previously ordered because she had switched to Dr. Rey but wants to switch back to [facility]. Had cholecystectomy done on 5/31/2024. Patient reports Dr. Rey did not do anything so she did not bring copy of outside records for review.    Reviewed hospital discharge summary: "Admission Date: 7/26/2024  Hospital Length of Stay: 0 days  Discharge Date and Time: 7/27/2024  6:13 PM  Attending Physician: No att. providers found   Discharging Provider: Cy Samuel MD  Primary Care Provider: Savi Parks DO  Primary Care Team: Networked reference to record PCT  HPI (from H&P): 28 F w/ PMHx of Cholelithiasis s/p Cholecystectomy, Esophagitis, Gastritis, ADHD and HLD presented to [facility] on 7/26/24 with intractable nausea/vomiting and severe abdominal pain beginning yesterday morning.  Initial vital signs were Temp 98.4, HR 88, RR 18, /83 and SpO2 99% on room air. Labs showed WBC 15.74, CO2 15, corrected Anion Gap 12.8, AST 64, , Cr 0.72, Albumin 4.9, UA with 28 RBC and 3 WBC. CT Abdomen Pelvis and Pelvic US showed no acute abnormalities She received 2 L of Normal Saline, IV Protonix 80 mg, IM Phenergan 12.5 mg, IV Zofran 4 mg and IV Morphine 4 mg in the ED. [facility] was consulted for admission.  Pt is resting in bed in ED. She is hemodynamically stable and not in acute distress, but she appears very uncomfortable, diaphoretic and vomits multiple times in emesis bag during conversation. Her  is present and participates in the conversation. She reports originally experiencing significantly improved nausea/vomiting after her cholecystectomy on 5/31/24. However, she still had persistent " "generalized abdominal cramping pain which remained constant everyday. It was worsened by menstruation. Yesterday she woke up with even further worsened and severe abdominal pain (generalized but worse in RLQ) and new nausea/vomiting. She has been unable to keep PO intake down. She reported recent constipation, but developed non-bloody diarrhea yesterday. She reported chills and diaphoresis but no fever. She denied chest pain or shortness of breath.   She was hospitalized in May 2024 with similar symptoms of abdominal pain and intractable nausea/vomiting. 5/22/24 EGD showed LA Grade C reflux esophagitis, gastritis and a small hiatal hernia (no H Pylori). 5/28/24 NM Gastric Emptying Study showed "delayed gastric emptying with T1/2 of approximately 250 minutes." 5/30/24 NM Hepatobiliary Scan showed "decreased gallbladder ejection fraction suggesting gallbladder dysfunction." She underwent laparoscopic Cholecystectomy on 5/31/24 and reportedly had improvement in symptoms at time of discharge.  * No surgery found *  Hospital Course: Pt remained afebrile with VS largely within normal limits. Leukocytosis and transaminitis resolved the next day after admission (possible stress response from intractable nausea/vomiting?) Lactic Acid normalized the same day of admission after receiving IV fluids. There was no growth in blood cultures. Her diarrhea resolved because she was unable to produce a stool sample; GI Panel and C Diff were canceled. Gastroenterology (Dr. Higgins) did see her on 7/26/24, though have not left a note yet. I am told through RN that GI was agreeable with her going home if she could tolerate a regular diet.  I personally evaluated and examined pt on 7/27/24. She felt dramatically better. Her vomiting had resolved. Her abdominal pain had improved and was now tolerable. She was able to eat and tolerate a regular, solid diet. She is medically appropriate for, and agreeable to, discharge home today. She has f/u " "appt with Dr. Rey scheduled for next week for further investigation of her condition. She will continue Protonix 40 mg BID, Carafate 1 g q6h, Phenergan 25 mg PRN and Zofran 8 mg PRN at discharge."    GI Problem  The primary symptoms include weight loss (reports decreased appetite for the past 2 years; not trying to lose weight), abdominal pain, nausea and vomiting. Primary symptoms do not include fever, fatigue, diarrhea, melena, hematemesis, jaundice, hematochezia or dysuria.   The abdominal pain began more than 2 days ago (started ~5/18/2024, reports participated in a nurf gun sparrow and reports she had got tackled accidentally; had initially improved after taking tums, recurred after eating prime rib). Progression: waxing and waning. The abdominal pain is located in the epigastric region, LUQ, periumbilical region and RUQ (constant; described as soreness & churning discomfort, intermittent sharp). The severity of the abdominal pain is 4/10 (currently). Relieved by: rest, bland diet; worse with stress & red meat, movement & walking.   Nausea began more than 1 week ago (occurs daily).   The vomiting began more than 2 days ago. Frequency: occurs at least 3 days a week; worse with activity (sex) The emesis contains undigested food and bilious material.   The illness is also significant for chills (intermittent flushing sensation and then gets chills), dysphagia and constipation (bowel movements are once every few days; magniuesm citrate PRN helps). The illness does not include odynophagia. Associated symptoms comments: TREATMENT: protonix 40 mg once daily, carafate 1 gram QID  PAST TREATMENT: probiotic, reglan- side effects of shakiness and cognitive impairment. Significant associated medical issues include GERD (none recently; controlled with taking protonix 40 mg once daily (recurs when off of it)). Associated medical issues do not include inflammatory bowel disease.     Review of Systems   Constitutional:  " Positive for appetite change (decreased; eating 2 small meals a day), chills (intermittent flushing sensation and then gets chills) and weight loss (reports decreased appetite for the past 2 years; not trying to lose weight). Negative for fatigue and fever.        Not taking NSAID currently but prior was taking NSAID frequently for history of fibromyalgia   HENT:  Positive for trouble swallowing (occasional feels like it takes longer for liquids or pills go down; denies problems with food). Negative for sore throat.    Respiratory:  Negative for cough, choking and shortness of breath.    Cardiovascular:  Negative for chest pain.   Gastrointestinal:  Positive for abdominal pain, constipation (bowel movements are once every few days; magniuesm citrate PRN helps), dysphagia, nausea, rectal pain (pressure at times) and vomiting. Negative for anal bleeding, blood in stool, diarrhea, hematemesis, hematochezia, jaundice and melena.   Genitourinary:  Negative for difficulty urinating, dysuria and flank pain.   Neurological:  Positive for dizziness (seeing PCP for it, mostly positional). Negative for weakness.       Patient's last menstrual period was 10/24/2024 (exact date).  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    PCOS (polycystic ovarian syndrome)     Vitamin D insufficiency      Past Surgical History:   Procedure Laterality Date    ESOPHAGOGASTRODUODENOSCOPY N/A 05/22/2024    Procedure: EGD (ESOPHAGOGASTRODUODENOSCOPY);  Surgeon: Ean Rojas MD;  Location: [facility];  Service:  Gastroenterology;  Laterality: N/A;    LAPAROSCOPIC CHOLECYSTECTOMY N/A 5/31/2024    Procedure: CHOLECYSTECTOMY, LAPAROSCOPIC;  Surgeon: Heriberto Motta MD;  Location: [facility] OR;  Service: General;  Laterality: N/A;    TONSILLECTOMY  07/01/2014     Family History   Problem Relation Name Age of Onset    Migraines Mother      Diverticulitis Father      Stroke Maternal Grandmother      Migraines Maternal Grandmother      Gallbladder disease Maternal Grandmother      Hearing loss Maternal Grandfather      Stroke Maternal Grandfather      Hypertension Maternal Grandfather      Colon cancer Neg Hx      Ulcerative colitis Neg Hx      Stomach cancer Neg Hx      Esophageal cancer Neg Hx      Crohn's disease Neg Hx       Social History     Tobacco Use    Smoking status: Never    Smokeless tobacco: Never   Substance Use Topics    Alcohol use: Never    Drug use: Yes     Frequency: 2.0 times per week     Types: Marijuana     Wt Readings from Last 10 Encounters:   12/10/24 101.4 kg (223 lb 8.7 oz)   12/02/24 103.3 kg (227 lb 12.8 oz)   09/25/24 105.2 kg (231 lb 14.8 oz)   08/01/24 105.6 kg (232 lb 12.8 oz)   07/27/24 105.6 kg (232 lb 12.9 oz)   06/20/24 107.9 kg (237 lb 14.4 oz)   06/13/24 108 kg (238 lb)   05/28/24 108 kg (238 lb)   05/27/24 108.3 kg (238 lb 12.1 oz)   05/21/24 105.1 kg (231 lb 11.3 oz)     Lab Results   Component Value Date    WBC 10.00 12/03/2024    HGB 13.5 12/03/2024    HCT 41.1 12/03/2024    MCV 87 12/03/2024     12/03/2024     CMP  Sodium   Date Value Ref Range Status   12/03/2024 139 136 - 145 mmol/L Final   07/10/2015 140 137 - 145 MMOL/L Final     Potassium   Date Value Ref Range Status   12/03/2024 4.1 3.5 - 5.1 mmol/L Final     Comment:     Anion Gap reference range revised on 4/28/2023   07/10/2015 4.1 3.5 - 5.1 MMOL/L Final     Chloride   Date Value Ref Range Status   12/03/2024 106 95 - 110 mmol/L Final   07/10/2015 102 98 - 107 MMOL/L Final     CO2   Date Value Ref Range Status   12/03/2024  30 22 - 31 mmol/L Final     Glucose   Date Value Ref Range Status   12/03/2024 87 70 - 110 mg/dL Final     Comment:     The ADA recommends the following guidelines for fasting glucose:    Normal:       less than 100 mg/dL    Prediabetes:  100 mg/dL to 125 mg/dL    Diabetes:     126 mg/dL or higher       BUN   Date Value Ref Range Status   12/03/2024 16 7 - 18 mg/dL Final     Creatinine   Date Value Ref Range Status   12/03/2024 0.76 0.50 - 1.40 mg/dL Final   07/10/2015 0.90 0.52 - 1.04 MG/DL Final     Calcium   Date Value Ref Range Status   12/03/2024 9.2 8.4 - 10.2 mg/dL Final     Total Protein   Date Value Ref Range Status   12/03/2024 6.9 6.0 - 8.4 g/dL Final     Albumin   Date Value Ref Range Status   12/03/2024 4.1 3.5 - 5.2 g/dL Final   07/10/2015 4.2 3.5 - 5.0 G/DL Final     Total Bilirubin   Date Value Ref Range Status   12/03/2024 0.3 0.2 - 1.3 mg/dL Final     Alkaline Phosphatase   Date Value Ref Range Status   12/03/2024 57 38 - 145 U/L Final     AST   Date Value Ref Range Status   12/03/2024 23 14 - 36 U/L Final     ALT   Date Value Ref Range Status   12/03/2024 24 0 - 35 U/L Final     Anion Gap   Date Value Ref Range Status   12/03/2024 3 (L) 5 - 12 mmol/L Final     Comment:     Anion Gap reference range revised on 4/28/2023     eGFR if    Date Value Ref Range Status   10/18/2021 >60.0 >60 mL/min/1.73 m^2 Final     eGFR if non    Date Value Ref Range Status   10/18/2021 >60.0 >60 mL/min/1.73 m^2 Final     Comment:     Calculation used to obtain the estimated glomerular filtration  rate (eGFR) is the CKD-EPI equation.        Lab Results   Component Value Date    LIPASERES 70 07/26/2024     Lab Results   Component Value Date    LIPASE 143 (H) 05/27/2024     Lab Results   Component Value Date    TSH 0.455 05/28/2024       Reviewed prior medical records including radiology report of 7/26/2024 pelvic ultrasound and CT abdomen pelvis with IV contrast; 5/30/2024 HIDA scan (EF  12%); 5/28/2024 limited abdominal ultrasound and gastric emptying study (delayed); 7/26/2024 consult note from Dr. Higgins; & endoscopy history (see surgical history/procedures).    Objective:      Physical Exam  Vitals and nursing note reviewed.   Constitutional:       General: She is not in acute distress.     Appearance: Normal appearance. She is well-developed. She is not diaphoretic.   HENT:      Mouth/Throat:      Lips: Pink. No lesions.      Mouth: Mucous membranes are moist. No oral lesions.      Tongue: No lesions.      Pharynx: Oropharynx is clear. No pharyngeal swelling or posterior oropharyngeal erythema.   Eyes:      General: No scleral icterus.     Conjunctiva/sclera: Conjunctivae normal.   Pulmonary:      Effort: Pulmonary effort is normal. No respiratory distress.      Breath sounds: Normal breath sounds. No wheezing.   Abdominal:      General: Bowel sounds are normal. There is no distension or abdominal bruit.      Palpations: Abdomen is soft. Abdomen is not rigid. There is no mass.      Tenderness: There is abdominal tenderness (mild) in the right upper quadrant, epigastric area, periumbilical area and left upper quadrant. There is no guarding or rebound.   Skin:     General: Skin is warm and dry.      Coloration: Skin is not jaundiced or pale.      Findings: No erythema or rash.   Neurological:      Mental Status: She is alert and oriented to person, place, and time.   Psychiatric:         Behavior: Behavior normal.         Thought Content: Thought content normal.         Judgment: Judgment normal.         Assessment:       1. Non-intractable vomiting with nausea    2. Gastroparesis    3. Weight loss    4. Upper abdominal pain    5. Gastroesophageal reflux disease with esophagitis without hemorrhage    6. S/P cholecystectomy    7. Pharyngoesophageal dysphagia    8. Constipation, unspecified constipation type    9. Chills    10. History of dizziness          Plan:       Non-intractable vomiting with  nausea  - CONTINUE ZOFRAN PRN AS DIRECTED FOR NAUSEA  - schedule EGD, discussed procedure with patient, including risks and benefits, patient verbalized understanding  - avoid marijuana use    Gastroparesis  - Recommend follow-up with GI physician for continued evaluation and management.  -     Ambulatory referral/consult to General Surgery; Future; Expected date: 12/17/2024  - Discussed diagnosis and possibly etiologies and that it can be idiopathetic, may also improve over time or can be lifelong, patient verbalized understanding  Recommend small frequent meals instead of 3 big meals a day, low fat meals & low residue diet.  Avoid: high fiber (insoluble fiber), red meats, dairy, and non-digestible solids (peels, fruit pulp, etc). Avoid NSAIDs and narcotics.  - schedule EGD, discussed procedure with patient, including risks and benefits, patient verbalized understanding  -Discussed about possible medical therapy and discussed that this would be managed by one of the GI doctors, lastly possible referral to general surgery for surgical options, patient verbalized understanding    Weight loss  - schedule EGD, discussed procedure with patient, including risks and benefits, patient verbalized understanding  - schedule Colonoscopy, discussed procedure with the patient, including risks and benefits, patient verbalized understanding  -     IgA; Future; Expected date: 05/27/2024  -     Tissue Transglutaminase, IgA; Future; Expected date: 05/27/2024  - encouraged PO intake and daily calorie counts to ensure adequate nutrition is taken in, recommend at least 2,000 calories a day  - recommend nutritional drinks, such as Boost, Ensure or Glucerna, to supplement nutrition needs    Upper abdominal pain  - schedule EGD, discussed procedure with patient, including risks and benefits, patient verbalized understanding    Gastroesophageal reflux disease with esophagitis without hemorrhage  - schedule EGD, discussed procedure with  patient, including risks and benefits, patient verbalized understanding  - CONTINUE PROTONIX 40 MG ONCE DAILY AS DIRECTED    S/P cholecystectomy    Pharyngoesophageal dysphagia  - schedule EGD, discussed procedure with patient and possible esophageal dilation may be performed during procedure if indicated, patient verbalized understanding  - recommend to eat smaller more frequent meals and to eat slowly and advised to eat a soft diet. Take medications one at a time with a full glass of water.  - possible UGI/esophagram/esophageal manometry if symptoms persist    Constipation, unspecified constipation type  -   START  polyethylene glycol (GLYCOLAX) 17 gram/dose powder; Take 17 g by mouth once daily. Mix with 8 oz of water/liquid.  Dispense: 510 g; Refill: 2  - Recommend daily exercise as tolerated & adequate water intake (six 8-oz glasses of water daily)  -Recommend taking an OTC stool softener such as Colace as directed to avoid hard stools and straining with bowel movements PRN  - If no improvement with above recommendations, try intermittently dosed Dulcolax OTC as directed (every 3-4  days) PRN to facilitate bowel movements  -If still no improvement with these measures, call/follow-up    Chills & History of dizziness  - Recommend follow-up with Primary Care Provider for continued evaluation and management.    Follow up in about 1 month (around 1/10/2025), or if symptoms worsen or fail to improve, for FOLLOW-UP WITH GI PHYSICIAN FOR CONTINUED EVALUATION AND MANAGEMENT.      If no improvement in symptoms or symptoms worsen, call/follow-up at clinic or go to ER.        39 minutes of total time spent on the encounter, which includes face to face time and non-face to face time preparing to see the patient (e.g., review of tests), Obtaining and/or reviewing separately obtained history, Documenting clinical information in the electronic or other health record, Independently interpreting results (not separately reported)  and communicating results to the patient/family/caregiver, or Care coordination (not separately reported).

## 2024-12-10 ENCOUNTER — OFFICE VISIT (OUTPATIENT)
Dept: GASTROENTEROLOGY | Facility: CLINIC | Age: 29
End: 2024-12-10
Payer: COMMERCIAL

## 2024-12-10 VITALS — HEIGHT: 65 IN | WEIGHT: 223.56 LBS | BODY MASS INDEX: 37.25 KG/M2 | RESPIRATION RATE: 17 BRPM

## 2024-12-10 DIAGNOSIS — R63.4 WEIGHT LOSS: ICD-10-CM

## 2024-12-10 DIAGNOSIS — Z90.49 S/P CHOLECYSTECTOMY: ICD-10-CM

## 2024-12-10 DIAGNOSIS — K21.00 GASTROESOPHAGEAL REFLUX DISEASE WITH ESOPHAGITIS WITHOUT HEMORRHAGE: ICD-10-CM

## 2024-12-10 DIAGNOSIS — K59.00 CONSTIPATION, UNSPECIFIED CONSTIPATION TYPE: ICD-10-CM

## 2024-12-10 DIAGNOSIS — K31.84 GASTROPARESIS: ICD-10-CM

## 2024-12-10 DIAGNOSIS — Z87.898 HISTORY OF DIZZINESS: ICD-10-CM

## 2024-12-10 DIAGNOSIS — R13.14 PHARYNGOESOPHAGEAL DYSPHAGIA: ICD-10-CM

## 2024-12-10 DIAGNOSIS — R10.10 UPPER ABDOMINAL PAIN: ICD-10-CM

## 2024-12-10 DIAGNOSIS — R11.2 NON-INTRACTABLE VOMITING WITH NAUSEA: Primary | ICD-10-CM

## 2024-12-10 DIAGNOSIS — R68.83 CHILLS: ICD-10-CM

## 2024-12-10 PROCEDURE — 3008F BODY MASS INDEX DOCD: CPT | Mod: CPTII,S$GLB,, | Performed by: NURSE PRACTITIONER

## 2024-12-10 PROCEDURE — 99999 PR PBB SHADOW E&M-EST. PATIENT-LVL IV: CPT | Mod: PBBFAC,,, | Performed by: NURSE PRACTITIONER

## 2024-12-10 PROCEDURE — 1159F MED LIST DOCD IN RCRD: CPT | Mod: CPTII,S$GLB,, | Performed by: NURSE PRACTITIONER

## 2024-12-10 PROCEDURE — 1160F RVW MEDS BY RX/DR IN RCRD: CPT | Mod: CPTII,S$GLB,, | Performed by: NURSE PRACTITIONER

## 2024-12-10 PROCEDURE — 99214 OFFICE O/P EST MOD 30 MIN: CPT | Mod: S$GLB,,, | Performed by: NURSE PRACTITIONER

## 2024-12-10 PROCEDURE — 3044F HG A1C LEVEL LT 7.0%: CPT | Mod: CPTII,S$GLB,, | Performed by: NURSE PRACTITIONER

## 2024-12-10 RX ORDER — POLYETHYLENE GLYCOL 3350 17 G/17G
17 POWDER, FOR SOLUTION ORAL DAILY
Qty: 510 G | Refills: 2 | Status: SHIPPED | OUTPATIENT
Start: 2024-12-10

## 2024-12-11 NOTE — PATIENT INSTRUCTIONS
"   Gastroparesis (Delayed Gastric Emptying)   The Basics   Written by the doctors and editors at [facility]   What is gastroparesis? -- Gastroparesis is a condition that causes nausea and vomiting. It can also make you feel full too soon after you start eating. It happens because the stomach takes too long to empty and does not move food along through your body fast enough (figure 1). Gastroparesis is also called "delayed gastric emptying." ("Gastric" means "having to do with the stomach.")  Gastroparesis is a common problem among people with diabetes. It also can happen to people who have had food poisoning (gastroenteritis). But it can sometimes happen to people who have not been sick and who do not have diabetes.  When gastroparesis starts after someone has had food poisoning, it often gets better in a few days to weeks. Sometimes it lasts longer or never goes away. In people with diabetes, it usually does not go away, but there are things that can make it better.  What are the symptoms of gastroparesis? -- The symptoms can include:  Nausea with or without vomiting  Belly pain  Feeling full too soon after you start eating  Bloating (feeling as though your stomach is full of air)  Weight loss  Is there a test for gastroparesis? -- Yes. If your doctor or nurse suspects you have gastroparesis, he or she might do 1 or more of these tests:  Endoscopy - A doctor or nurse puts a thin tube down your throat and into your stomach. The tube (called an endoscope) has a light and a tiny camera on the end, so it allows the doctor or nurse to see inside your stomach. If he or she still finds food in your stomach even though it has been more than 8 hours since you ate, that is a sign that you might have gastroparesis.  Barium follow through, CT scan, or MRI - For these tests you eat a special substance that shows up on imaging. This can show if there is something blocking the flow of food, keeping it from leaving your " "stomach.  Gastric emptying scintigraphy - For this test you eat food that has a small amount of a radioactive material in it. Then you have images taken of the inside of your body for up to 4 hours. That way, doctors can follow where the food goes and how fast it gets there. If you still have a fair amount of food in your stomach after 4 hours, that means you have gastroparesis.  Breath test - This is a newer test that measures substances in your breath after you have eaten a special meal. The measurements can sometimes show if you have gastroparesis.  Should I change my diet to feel better? -- Yes. Some people feel better if they:  Eat 4 to 5 small meals during the day instead of 2 or 3 big ones.  Put food through the  before eating it.  Cut down on foods that have a lot of fat, such as cheese and fried foods.  Cut down on foods that have a lot of "insoluble" fiber, such as some fruits, vegetables, and beans.  Avoid fizzy drinks, like soda, as they can cause more bloating and gas  Avoid alcohol and smoking  If you have diabetes, it's also very important to keep your blood sugar as close to normal as possible.  Should I see a doctor or nurse? -- See your doctor or nurse if you have ongoing nausea or vomiting, belly pain, trouble eating, or weight loss.  How is gastroparesis treated? -- Treatments can include:  Treatments to help you get the food and fluids you need - This might include taking liquid food supplements or - in rare cases - being fed through a tube.  Medicines that make the stomach empty faster  Medicines that help prevent nausea  Emptying the stomach with a tube - To do this, doctors can put a tube down your throat or directly into your stomach through your skin.  Electrical stimulation of the stomach - In very rare cases and for certain causes of gastroparesis, doctors use electrical stimulation to make the stomach empty.  All topics are updated as new evidence becomes available and our peer " "review process is complete.  This topic retrieved from [employer] on: Sep 21, 2021.  Topic 60367 Version 8.0  Release: 29.4.2 - C29.263  © 2021 UpToDate, Inc. and/or its affiliates. All rights reserved.  figure 1: Digestive system     This drawing shows the organs in the body that process food. Together these organs are called "the digestive system," or "digestive tract." As food travels through this system, the body absorbs nutrients and water.  Graphic 03444 Version 4.0    Consumer Information Use and Disclaimer   This information is not specific medical advice and does not replace information you receive from your health care provider. This is only a brief summary of general information. It does NOT include all information about conditions, illnesses, injuries, tests, procedures, treatments, therapies, discharge instructions or life-style choices that may apply to you. You must talk with your health care provider for complete information about your health and treatment options. This information should not be used to decide whether or not to accept your health care provider's advice, instructions or recommendations. Only your health care provider has the knowledge and training to provide advice that is right for you. The use of this information is governed by the [employer] End User License Agreement, available at https://www.[employer].saperatec/en/solutions/[employer]/about/nicol.The use of [employer] content is governed by the [employer] Terms of Use. ©2021 UpToDate, Inc. All rights reserved.  Copyright   © 2021 UpToDate, Inc. and/or its affiliates. All rights reserved. Acid Reflux and GERD in Adults Discharge Instructions   About this topic   GERD stands for gastroesophageal reflux disease. It is sometimes called reflux or acid reflux. Acid reflux happens when your stomach acid backs up into your esophagus, the tube that carries your food from your mouth to your stomach. This can be uncomfortable. You may have stomach or " chest pain (heartburn), trouble swallowing, or an upset stomach. Some people have a cough or sore throat.  Most of the time, you can use over-the-counter medicines to help with this problem.       What care is needed at home?   Ask your doctor what you need to do when you go home. Make sure you ask questions if you do not understand what the doctor says.  Raise the head of your bed by 6 to 8 inches (15 to 20 cm). Use wood or rubber blocks under 2 legs or try a foam wedge under your mattress. Just sleeping with your head raised on pillows is not enough.  Avoid beer, wine, and mixed drinks and avoid caffeine.  Keep a healthy weight. If you are too heavy, lose weight.  If you smoke, try to quit. Your doctor or nurse can help.  Keep a diary of your signs. Write down what you had to eat before you had reflux. This will help you learn which foods cause you problems. For some people, they need to avoid coffee, chocolate, alcohol, spicy or fatty foods, or peppermint.  Avoid eating for 2 to 3 hours before bedtime. Lying down after you eat can make reflux worse.  Avoid belts and clothes that are too tight.  What follow-up care is needed?   Your doctor may ask you to make visits to the office to check on your progress. Be sure to keep these visits.  What drugs may be needed?   The doctor may order drugs to:  Relieve heartburn  Prevent reflux  Lessen acid production  Heal the esophageal lining  Will physical activity be limited?   Your physical activities will not be limited.  What problems could happen?   Asthma  Precancerous changes in the food pipe  Long-term cough  Dental problems  Higher risk of cancer of the food pipe. This is esophageal cancer.  Narrowing of the food pipe. This is a stricture.  Open sore in the food pipe. This is an ulcer.  When do I need to call the doctor?   You have signs of a heart attack, which may include:  Severe chest pain, pressure, or discomfort with:  Breathing trouble, sweating, upset  stomach, or cold, clammy skin.  Pain in your arms, back, or jaw.  Worse pain with activity like walking up stairs.  Fast or irregular heartbeat.  Feeling dizzy, faint, or weak.  You have sudden, severe belly pain or the belly pain is constant.  You have blood in the undigested food and acid that comes up, or stool that looks red, black, or like tar.  You feel like your food gets stuck or you have pain when you swallow.  You lose weight when you are not trying to.  You choke when you are eating.  Your reflux is very bad, very frequent, or not helped by over-the-counter medicines.  You keep throwing up.  Teach Back: Helping You Understand   The Teach Back Method helps you understand the information we are giving you. After you talk with the staff, tell them in your own words what you learned. This helps to make sure the staff has described each thing clearly. It also helps to explain things that may have been confusing. Before going home, make sure you can do these:  I can tell you about my condition.  I can tell you what changes I need to make with my eating habits to ease the reflux.  I can tell you what I will do if I am throwing up fluid that looks like blood or coffee grounds.  Where can I learn more?   American Academy of Family Physicians  https://familydoctor.org/condition/refluxacid-reflux/   NHS Choices  https://www.nhs.uk/conditions/heartburn-and-acid-reflux/   Last Reviewed Date   2021-06-09  Consumer Information Use and Disclaimer   This information is not specific medical advice and does not replace information you receive from your health care provider. This is only a brief summary of general information. It does NOT include all information about conditions, illnesses, injuries, tests, procedures, treatments, therapies, discharge instructions or life-style choices that may apply to you. You must talk with your health care provider for complete information about your health and treatment options. This  information should not be used to decide whether or not to accept your health care providers advice, instructions or recommendations. Only your health care provider has the knowledge and training to provide advice that is right for you.  Copyright   Copyright © 2021 UpToDate, Inc. and its affiliates and/or licensors. All rights reserved.      Soft Diet   About this topic   Some people have problems chewing or swallowing. This might happen after a procedure or illness. A soft diet can give you good nutrition until you can chew and swallow normally.       What will the results be?   You can still get a balanced diet with these foods. You will have less trouble chewing or swallowing.  What changes to diet are needed?   You may need to replace some harder foods with softer foods. These will be easier to chew or swallow, but will have the same nutrients.  Who should use this diet?   Your doctor might suggest a soft diet if you:  Are having radiation therapy on the head, neck, or belly  Had stomach or gut surgery  Are too sick or weak to eat a normal meal  Have poor teeth  Have swallowing problems  Have trouble chewing regular food  What foods are good to eat?   You will be eating foods that are easy to chew and swallow on this diet. These foods are naturally soft. If not, you can cook, chop, or mash them to make them soft. These include:  Cream soups  Moist, tender, well-cooked meats  Fish  Soft chicken without skin  Turkey  Ground meats  Milk products  Yogurt (without nuts, seeds, or raw fruit)  Cottage cheese  Cooked or canned fruit  Soft, peeled fresh fruits without large seeds  Fruit or vegetable juice  Cooked or canned vegetables  Mashed, baked, steamed, or boiled vegetables  Cooked cereals  Rice, if tolerated  Soft breads and crackers  Soft, cooked pasta  Butter  Smooth nut and seed butters  Mayonnaise  Sour cream  Vegetable oil  Smooth ice cream  Sherbet  Custards  Puddings  Cakes  What foods should be limited or  avoided?   You should stay away from foods that are hard to chew or swallow, such as:  Tough, dry meat  Yogurt with nuts or coconut  Raw fruits and vegetables  Cooked corn or peas  Dried fruits  Fruit snacks, such as fruit leather  Uncooked, stringy fruits and vegetables, like celery  Fried vegetables  Chewy, dry breads  Crispy crackers or chips  Coarse, dry cereals  Nuts and seeds  [place] nut and seed butters  Fried foods  Sausage  Bustillos  Cold cuts  Strong, hard cheeses  Peanut brittle  Candy with dried fruit or chewy and sticky candy, like caramel  Will there be any other care needed?   Use a  or  to mash foods if needed.  Steam foods to keep the nutrients. If boiling potatoes, peel after cooking.  Bite-sized pieces are easier to swallow. Pieces should be less than one inch in size.  Foods should be moist. Add gravy, sauce, vegetable juice, fruit juice, broth, milk, or water to moisten foods.  Reheat foods carefully to avoid a tough crust forming on your food.  When do I need to call the doctor?   Call your doctor or dietitian to talk about a good meal plan for you or if you are having problems eating a soft diet. Talk to your doctor about what type of soft diet you need.  Last Reviewed Date   2021-06-23  Consumer Information Use and Disclaimer   This information is not specific medical advice and does not replace information you receive from your health care provider. This is only a brief summary of general information. It does NOT include all information about conditions, illnesses, injuries, tests, procedures, treatments, therapies, discharge instructions or life-style choices that may apply to you. You must talk with your health care provider for complete information about your health and treatment options. This information should not be used to decide whether or not to accept your health care providers advice, instructions or recommendations. Only your health care provider has the  knowledge and training to provide advice that is right for you.  Copyright   Copyright © 2021 [employer] and its affiliates and/or licensors. All rights reserved.

## 2024-12-12 ENCOUNTER — OFFICE VISIT (OUTPATIENT)
Dept: ORTHOPEDICS | Facility: CLINIC | Age: 29
End: 2024-12-12
Payer: COMMERCIAL

## 2024-12-12 ENCOUNTER — HOSPITAL ENCOUNTER (OUTPATIENT)
Dept: RADIOLOGY | Facility: HOSPITAL | Age: 29
Discharge: HOME | End: 2024-12-12
Attending: ORTHOPAEDIC SURGERY
Payer: COMMERCIAL

## 2024-12-12 DIAGNOSIS — M25.362 INSTABILITY OF LEFT KNEE JOINT: ICD-10-CM

## 2024-12-12 DIAGNOSIS — S83.8X2A ACUTE LATERAL MENISCAL INJURY OF LEFT KNEE, INITIAL ENCOUNTER: Primary | ICD-10-CM

## 2024-12-12 DIAGNOSIS — M25.562 LEFT KNEE PAIN, UNSPECIFIED CHRONICITY: ICD-10-CM

## 2024-12-12 PROCEDURE — 73560 X-RAY EXAM OF KNEE 1 OR 2: CPT | Mod: 26,59,RT, | Performed by: RADIOLOGY

## 2024-12-12 PROCEDURE — 99999 PR PBB SHADOW E&M-EST. PATIENT-LVL II: CPT | Mod: PBBFAC,,, | Performed by: ORTHOPAEDIC SURGERY

## 2024-12-12 PROCEDURE — 3044F HG A1C LEVEL LT 7.0%: CPT | Mod: CPTII,S$GLB,, | Performed by: ORTHOPAEDIC SURGERY

## 2024-12-12 PROCEDURE — 99203 OFFICE O/P NEW LOW 30 MIN: CPT | Mod: S$GLB,,, | Performed by: ORTHOPAEDIC SURGERY

## 2024-12-12 PROCEDURE — 1160F RVW MEDS BY RX/DR IN RCRD: CPT | Mod: CPTII,S$GLB,, | Performed by: ORTHOPAEDIC SURGERY

## 2024-12-12 PROCEDURE — 73562 X-RAY EXAM OF KNEE 3: CPT | Mod: TC,PO,LT

## 2024-12-12 PROCEDURE — 73562 X-RAY EXAM OF KNEE 3: CPT | Mod: 26,LT,, | Performed by: RADIOLOGY

## 2024-12-12 PROCEDURE — 1159F MED LIST DOCD IN RCRD: CPT | Mod: CPTII,S$GLB,, | Performed by: ORTHOPAEDIC SURGERY

## 2024-12-12 NOTE — PROGRESS NOTES
"Status/Diagnosis: Left knee lateral meniscal injury.  Date of Surgery: none  Date of Injury: October 2024  Return visit: PRN  X-rays on Return: pending patient complaint    Chief Complaint:   Chief Complaint   Patient presents with    Left Knee - Pain, Injury     Present History:  Georgie presents with knee pain that began at the end of October this year after hitting her knee on a bench, with multiple falls prior. She reports ongoing pain since the incident, with the knee sometimes "locking up" while walking and episodes where it "gives away" during ambulation. She denies any knee issues prior to the October injury. Initial swelling and bruising occurred after the injury. She has not sought medical attention for this issue before today, hoping it would improve on its own.    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 She reports that stairs are particularly challenging at present. She has been using a borrowed knee brace from a friend, which has provided some relief. She is unable to take anti-inflammatory medications due to gastroparesis.    The area of most pain is on the outside of the knee, which is tender to touch. She also reports some pain on the sides of the knee and a little on the kneecap. She denies any episodes where the knee is physically flexed and unable to extend. : Normal job where she sits at a desk all day.  [place] [facility] [employer]: Works as an elf, which involves getting up and down on a trailer and walking around (seasonal job)  Georgie reports difficulty with stairs, affecting mobility at work, particularly in the elf role.     PMH significant for Factor V Leiden?; asthma; Gastroparesis (unable to take oral NSAIDs).  Denies tobacco use.      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    PCOS (polycystic ovarian syndrome)     Vitamin D insufficiency        Past Surgical History:   Procedure Laterality Date    ESOPHAGOGASTRODUODENOSCOPY N/A 05/22/2024    Procedure: EGD (ESOPHAGOGASTRODUODENOSCOPY);  Surgeon: Ean Rojas MD;  Location: [facility] ENDO;  Service: Gastroenterology;  Laterality: N/A;    LAPAROSCOPIC CHOLECYSTECTOMY N/A 5/31/2024    Procedure: CHOLECYSTECTOMY, LAPAROSCOPIC;  Surgeon: Heriberto Motta MD;  Location: [facility] OR;  Service: General;  Laterality: N/A;    TONSILLECTOMY  07/01/2014       Current Outpatient Medications   Medication Sig    acetaminophen (TYLENOL) 325 MG tablet Take 650 mg by mouth every 6 (six) hours as needed for Pain.    albuterol (PROVENTIL/VENTOLIN HFA) 90 mcg/actuation inhaler INHALE TWO PUFFS BY MOUTH EVERY SIX HOURS AS NEEDED FOR WHEEZING.    dextroamphetamine-amphetamine (ADDERALL) 20 mg tablet Take 30 mg by mouth 2 (two) times daily.    fluticasone-salmeterol diskus inhaler 250-50 mcg Inhale 1 puff into the lungs 2 (two) times daily. Controller    meclizine (ANTIVERT) 25 mg tablet Take 1 tablet (25 mg total) by mouth 3 (three) times daily as needed for Dizziness.    mupirocin (BACTROBAN) 2 % ointment Apply topically 3 (three) times daily.    ondansetron (ZOFRAN-ODT) 4 MG TbDL 50 tablets.    polyethylene glycol (GLYCOLAX) 17 gram/dose powder Take 17 g by mouth once daily. Mix with 8 oz of water/liquid.    promethazine (PHENERGAN) 25 MG suppository 30    pantoprazole (PROTONIX) 40 MG tablet Take 1 tablet (40 mg total) by mouth once daily.     No current facility-administered medications for this visit.       Review of patient's  allergies indicates:   Allergen Reactions    Latex, natural rubber     Plum     Sulfa (sulfonamide antibiotics)        Family History   Problem Relation Name Age of Onset    Migraines Mother      Diverticulitis Father      Stroke Maternal Grandmother      Migraines Maternal Grandmother      Gallbladder disease Maternal Grandmother      Hearing loss Maternal Grandfather      Stroke Maternal Grandfather      Hypertension Maternal Grandfather      Colon cancer Neg Hx      Ulcerative colitis Neg Hx      Stomach cancer Neg Hx      Esophageal cancer Neg Hx      Crohn's disease Neg Hx         Social History     Socioeconomic History    Marital status:    Tobacco Use    Smoking status: Never    Smokeless tobacco: Never   Substance and Sexual Activity    Alcohol use: Never    Drug use: Yes     Frequency: 2.0 times per week     Types: Marijuana    Sexual activity: Yes     Partners: Male     Birth control/protection: None     Comment: marrued     Social Drivers of Health     Financial Resource Strain: Patient Declined (1/4/2024)    Overall Financial Resource Strain (CARDIA)     Difficulty of Paying Living Expenses: Patient declined   Food Insecurity: No Food Insecurity (7/26/2024)    Hunger Vital Sign     Worried About Running Out of Food in the Last Year: Never true     Ran Out of Food in the Last Year: Never true   Transportation Needs: No Transportation Needs (7/26/2024)    TRANSPORTATION NEEDS     Transportation : No   Physical Activity: Insufficiently Active (1/4/2024)    Exercise Vital Sign     Days of Exercise per Week: 2 days     Minutes of Exercise per Session: 30 min   Stress: Stress Concern Present (1/4/2024)    [place] [employer] - Occupational Stress Questionnaire     Feeling of Stress : Rather much   Housing Stability: Low Risk  (7/26/2024)    Housing Stability Vital Sign     Unable to Pay for Housing in the Last Year: No     Homeless in the Last Year: No       Physical exam:  There  were no vitals filed for this visit.  There is no height or weight on file to calculate BMI.  General: In no apparent distress; well developed and well nourished.  HEENT: normocephalic; atraumatic.  Cardiovascular: regular rate.  Respiratory: no increased work of breathing.  Musculoskeletal:   Gait: nonantalgic  Inspection:   No gross deformity noted.  No effusion.  Patient localizes pain to the anterior lateral knee joint line with moderate tenderness on deep palpation.  Tenderness to a lesser degree over the posterolateral knee joint.  Stable to varus and valgus stress.  Negative Lachman and posterior drawer testing but with some pain. (+) clicking with Zak's.  Gross motor and sensory function intact.  Palpable DP/PT pulses.                   Imaging Studies/Outside documentation:  I have ordered/reviewed/interpreted the following images/outside documentation:  1. 3-views Left knee:   On my independent review, no acute bony abnormality noted.  Mild medial joint space narrowing.  Mild-to-moderate effusion.  Some degree of lateral translation of the patella relative to the trochlear groove but symmetric.        Assessment:  Georgie Dunlap is a 29 y.o. female with Left knee lateral meniscal injury.     Plan:   - Recommend using a knee brace for stability during daily activities and work.  - Discussed using topical Voltaren (diclofenac gel) for pain relief.  - Referred to physical therapy to work on dynamic stabilizers of the knee, help with pain management, and prevent future falls.  - Should symptoms persist or worsen, patient will contact my office at which point we will order MRI left knee without contrast for further evaluation.      We performed a custom orthotic/brace fitting, adjusting and training with the patient. The patient demonstrated understanding and proper care. This was performed for 15 minutes.    This note was created using voice recognition software and may contain grammatical errors.

## 2025-01-29 ENCOUNTER — OFFICE VISIT (OUTPATIENT)
Dept: OBSTETRICS AND GYNECOLOGY | Facility: CLINIC | Age: 30
End: 2025-01-29
Payer: COMMERCIAL

## 2025-01-29 DIAGNOSIS — E28.2 PCOS (POLYCYSTIC OVARIAN SYNDROME): Primary | ICD-10-CM

## 2025-01-29 RX ORDER — DROSPIRENONE 4 MG/1
1 TABLET, FILM COATED ORAL DAILY
Qty: 28 TABLET | Refills: 11 | Status: SHIPPED | OUTPATIENT
Start: 2025-01-29

## 2025-01-29 NOTE — PROGRESS NOTES
The patient location is: [place], [place]  The chief complaint leading to consultation is: follow up PCOS    Visit type: audio only - connectivity issue with video    Audio time with patient: 10 minutes  15 minutes of total time spent on the encounter, which includes face to face time and non-face to face time preparing to see the patient (eg, review of tests), Obtaining and/or reviewing separately obtained history, Documenting clinical information in the electronic or other health record, Independently interpreting results (not separately reported) and communicating results to the patient/family/caregiver, or Care coordination (not separately reported).         Each patient to whom he or she provides medical services by telemedicine is:  (1) informed of the relationship between the physician and patient and the respective role of any other health care provider with respect to management of the patient; and (2) notified that he or she may decline to receive medical services by telemedicine and may withdraw from such care at any time.    Notes:     Chief Complaint: Follow up PCOS management     HPI:      Georgie Dunlap is a 29 y.o.  who presents today for follow up PCOS. Started on Slynd for past few months, reports cycles are occurring monthly. Does note some breast soreness. Also reports reflux flares around her cycle time. Discussed can take pill continuously to prevent this if desires. No additional complaints or concerns.    Physical Exam:     APPEARANCE: Well nourished, well developed, in no acute distress.      Assessment/Plan:     PCOS (polycystic ovarian syndrome)  -     drospirenone, contraceptive, (SLYND) 4 mg (28) Tab; Take 1 tablet (4 mg total) by mouth once daily.  Dispense: 28 tablet; Refill: 11    - continue Slynd, will send in refill  - rtc when due for annual exam in October or sooner if needed

## 2025-03-14 ENCOUNTER — TELEPHONE (OUTPATIENT)
Dept: SURGERY | Facility: HOSPITAL | Age: 30
End: 2025-03-14
Payer: COMMERCIAL

## 2025-03-14 NOTE — TELEPHONE ENCOUNTER
Pt is scheduled for EGD/Colonoscopy on Weds., 3/19 - pt does not have prep instructions - can you please send them to her [employer] portal. Thank you.

## 2025-03-14 NOTE — TELEPHONE ENCOUNTER
Called and spoke with the patient, patient notified that her prep instructions were being sent to her patient portal now, patient verbalized understanding of this.          Magnesium Citrate Prep     ALERT: Please notify the clinic if you start any blood thinners as does affect your procedure     NOTE:  -No ASPIRIN or ibuprofen products the morning of your procedure.  This includes Motrin, ALEVE, Advil, or other arthritis type medications. Tylenol is allowed.  -AVOID the following foods for 7-10 days prior to procedure: beans, peas, corn, nuts, popcorn, or tomatoes. If you forget we will not cancel your procedure.    You will need (over the counter) :  -2 ten (10) ounce bottles Magnesium Citrate (clear only)   -4 Dulcolax laxative tablets (any brand)    ONE DAY BEFORE YOUR PROCEDURE:  Take 2 Dulcolax laxatives the night prior to your prep day AT BEDTIME   Begin the clear liquid diet the entire day before your procedure  At 10 am, take 2 Dulcolax tablets  AT 2:00 PM, you will drink your first bottle of Magnesium Citrate. It will taste better if refrigerated (directions on bottle state do not refrigerate, this is okay for THIS occasion)    -It is important that you flush your system with at least Five - 8 ounce glasses of clear liquids by 8 PM.  At 6:00 PM, you will drink your second bottle of Magnesium Citrate    -Flush your system with at least THREE - 8 ounce glasses of water by midnight.    CLEAR LIQUIDS INCLUDE: Coffee (no cream), tea, apple juice, white grape juice, limit carbonated drinks to 2 in 24 hours, bullion, chicken broth, beef broth, Gatorade, Edu-Aid, jello, popsicles, snowballs, Italian ice, and hard candy. NO ALCOHOL. NOTHING RED OR PURPLE.     A preop nurse will call the day prior to your procedure to discuss medications you can and cannot take the morning of your procedure. Since sedation is used, you must have someone drive you home. It is [facility] policy that you may not take a taxi home after  sedation.    Please let us know if you have any questions after reading these instructions.     Thank you for allowing us to participate in your healthcare needs.     Respectfully,             NOTE:  ·         Dulaglutide (Trulicity) (weekly) - hold 8 days  ·         Exenatide extended release (Bydureon bcise) (weekly) - hold 8 days  ·         Semaglutide (Ozempic) (weekly) - hold 8 days  Tirepatide (Mounjaro) (weekly) - hold 8 days  Wegovy (weekly) - hold 8 days  ·         Exenatide (Byetta) (twice daily)- hold DAY OF PROCEDURE  ·         Liraglutide (Victoza, Saxenda) (daily)- hold DAY OF PROCEDURE  ·         Lixisenatide (Adlyxin) (daily)- hold DAY OF PROCEDURE  ·         Semaglutide (Rybelsus) (daily) -hold DAY OF PROCEDURE

## 2025-03-18 ENCOUNTER — ANESTHESIA EVENT (OUTPATIENT)
Dept: ENDOSCOPY | Facility: HOSPITAL | Age: 30
End: 2025-03-18
Payer: COMMERCIAL

## 2025-03-18 NOTE — H&P
"History & Physical - Short Stay  Gastroenterology      SUBJECTIVE:     Procedure: Gastroscopy and Colonoscopy    Chief Complaint/Indication for Procedure: Upper abdo pain.  Nausea.  Possible gastroparesis.  Dysphagia.  Constipation.  Weight loss.    History of Present Illness:  See recent GI OV note:  Office Visit   12/10/2024  [place] - G[facility]       Diane Mccrary FNP  Gastroenterology Non-intractable vomiting with nausea +9 more  Dx Referred by Savi Parks DO  Reason for Visit     Progress Notes    Diane Mccrary FNP at 12/10/2024  8:00 AM    Status: Signed   Expand All Collapse All  Subjective:      Subjective  Patient ID: Georgie Dunlap is a 29 y.o. female Body mass index is 37.2 kg/m².     Chief Complaint: No chief complaint on file.     Referring Provider: Dr. Savi Ji* for nausea, GERD, & gastroparesis.  Established patient of Dr. Rojas & myself.     Patient did not complete EGD as previously ordered because she had switched to Dr. Rey but wants to switch back to [facility]. Had cholecystectomy done on 5/31/2024. Patient reports Dr. Rey did not do anything so she did not bring copy of outside records for review.     Reviewed hospital discharge summary: "Admission Date: 7/26/2024  Hospital Length of Stay: 0 days  Discharge Date and Time: 7/27/2024  6:13 PM  Attending Physician: No att. providers found   Discharging Provider: Cy Samuel MD  Primary Care Provider: Savi Parks DO  Primary Care Team: Networked reference to record PCT  HPI (from H&P): 28 F w/ PMHx of Cholelithiasis s/p Cholecystectomy, Esophagitis, Gastritis, ADHD and HLD presented to [facility] on 7/26/24 with intractable nausea/vomiting and severe abdominal pain beginning yesterday morning.  Initial vital signs were Temp 98.4, HR 88, RR 18, /83 and SpO2 99% on room air. Labs showed WBC 15.74, CO2 15, corrected Anion Gap 12.8, AST 64, , Cr 0.72, Albumin 4.9, " "UA with 28 RBC and 3 WBC. CT Abdomen Pelvis and Pelvic US showed no acute abnormalities She received 2 L of Normal Saline, IV Protonix 80 mg, IM Phenergan 12.5 mg, IV Zofran 4 mg and IV Morphine 4 mg in the ED. [facility] was consulted for admission.  Pt is resting in bed in ED. She is hemodynamically stable and not in acute distress, but she appears very uncomfortable, diaphoretic and vomits multiple times in emesis bag during conversation. Her  is present and participates in the conversation. She reports originally experiencing significantly improved nausea/vomiting after her cholecystectomy on 5/31/24. However, she still had persistent generalized abdominal cramping pain which remained constant everyday. It was worsened by menstruation. Yesterday she woke up with even further worsened and severe abdominal pain (generalized but worse in RLQ) and new nausea/vomiting. She has been unable to keep PO intake down. She reported recent constipation, but developed non-bloody diarrhea yesterday. She reported chills and diaphoresis but no fever. She denied chest pain or shortness of breath.   She was hospitalized in May 2024 with similar symptoms of abdominal pain and intractable nausea/vomiting. 5/22/24 EGD showed LA Grade C reflux esophagitis, gastritis and a small hiatal hernia (no H Pylori). 5/28/24 NM Gastric Emptying Study showed "delayed gastric emptying with T1/2 of approximately 250 minutes." 5/30/24 NM Hepatobiliary Scan showed "decreased gallbladder ejection fraction suggesting gallbladder dysfunction." She underwent laparoscopic Cholecystectomy on 5/31/24 and reportedly had improvement in symptoms at time of discharge.  * No surgery found *  Hospital Course: Pt remained afebrile with VS largely within normal limits. Leukocytosis and transaminitis resolved the next day after admission (possible stress response from intractable nausea/vomiting?) Lactic Acid normalized the same day of admission after receiving IV " "fluids. There was no growth in blood cultures. Her diarrhea resolved because she was unable to produce a stool sample; GI Panel and C Diff were canceled. Gastroenterology (Dr. Higgins) did see her on 7/26/24, though have not left a note yet. I am told through RN that GI was agreeable with her going home if she could tolerate a regular diet.  I personally evaluated and examined pt on 7/27/24. She felt dramatically better. Her vomiting had resolved. Her abdominal pain had improved and was now tolerable. She was able to eat and tolerate a regular, solid diet. She is medically appropriate for, and agreeable to, discharge home today. She has f/u appt with Dr. Rey scheduled for next week for further investigation of her condition. She will continue Protonix 40 mg BID, Carafate 1 g q6h, Phenergan 25 mg PRN and Zofran 8 mg PRN at discharge."     GI Problem  The primary symptoms include weight loss (reports decreased appetite for the past 2 years; not trying to lose weight), abdominal pain, nausea and vomiting. Primary symptoms do not include fever, fatigue, diarrhea, melena, hematemesis, jaundice, hematochezia or dysuria.   The abdominal pain began more than 2 days ago (started ~5/18/2024, reports participated in a [employer] and reports she had got tackled accidentally; had initially improved after taking tums, recurred after eating prime rib). Progression: waxing and waning. The abdominal pain is located in the epigastric region, LUQ, periumbilical region and RUQ (constant; described as soreness & churning discomfort, intermittent sharp). The severity of the abdominal pain is 4/10 (currently). Relieved by: rest, bland diet; worse with stress & red meat, movement & walking.   Nausea began more than 1 week ago (occurs daily).   The vomiting began more than 2 days ago. Frequency: occurs at least 3 days a week; worse with activity (sex) The emesis contains undigested food and bilious material.   The illness is also " significant for chills (intermittent flushing sensation and then gets chills), dysphagia and constipation (bowel movements are once every few days; magniuesm citrate PRN helps). The illness does not include odynophagia. Associated symptoms comments: TREATMENT: protonix 40 mg once daily, carafate 1 gram QID  PAST TREATMENT: probiotic, reglan- side effects of shakiness and cognitive impairment. Significant associated medical issues include GERD (none recently; controlled with taking protonix 40 mg once daily (recurs when off of it)). Associated medical issues do not include inflammatory bowel disease.      Review of Systems   Constitutional:  Positive for appetite change (decreased; eating 2 small meals a day), chills (intermittent flushing sensation and then gets chills) and weight loss (reports decreased appetite for the past 2 years; not trying to lose weight). Negative for fatigue and fever.        Not taking NSAID currently but prior was taking NSAID frequently for history of fibromyalgia   HENT:  Positive for trouble swallowing (occasional feels like it takes longer for liquids or pills go down; denies problems with food). Negative for sore throat.    Respiratory:  Negative for cough, choking and shortness of breath.    Cardiovascular:  Negative for chest pain.   Gastrointestinal:  Positive for abdominal pain, constipation (bowel movements are once every few days; magniuesm citrate PRN helps), dysphagia, nausea, rectal pain (pressure at times) and vomiting. Negative for anal bleeding, blood in stool, diarrhea, hematemesis, hematochezia, jaundice and melena.       Wt Readings from Last 15 Encounters:   03/14/25 101.6 kg (224 lb)   01/25/25 101.6 kg (224 lb)   12/12/24 101.9 kg (224 lb 9.6 oz)   12/10/24 101.4 kg (223 lb 8.7 oz)   12/02/24 103.3 kg (227 lb 12.8 oz)   09/25/24 105.2 kg (231 lb 14.8 oz)   08/01/24 105.6 kg (232 lb 12.8 oz)   07/27/24 105.6 kg (232 lb 12.9 oz)   06/20/24 107.9 kg (237 lb 14.4 oz)    06/13/24 108 kg (238 lb)   05/28/24 108 kg (238 lb)   05/27/24 108.3 kg (238 lb 12.1 oz)   05/21/24 105.1 kg (231 lb 11.3 oz)   01/04/24 110.8 kg (244 lb 3.2 oz)   09/14/23 117.6 kg (259 lb 4.8 oz)       Assessment:      Assessment  1. Non-intractable vomiting with nausea    2. Gastroparesis    3. Weight loss    4. Upper abdominal pain    5. Gastroesophageal reflux disease with esophagitis without hemorrhage    6. S/P cholecystectomy    7. Pharyngoesophageal dysphagia    8. Constipation, unspecified constipation type    9. Chills    10. History of dizziness             Plan:      Plan  Non-intractable vomiting with nausea  - CONTINUE ZOFRAN PRN AS DIRECTED FOR NAUSEA  - schedule EGD, discussed procedure with patient, including risks and benefits, patient verbalized understanding  - avoid marijuana use     Gastroparesis  - Recommend follow-up with GI physician for continued evaluation and management.  -     Ambulatory referral/consult to General Surgery; Future; Expected date: 12/17/2024  - Discussed diagnosis and possibly etiologies and that it can be idiopathetic, may also improve over time or can be lifelong, patient verbalized understanding  Recommend small frequent meals instead of 3 big meals a day, low fat meals & low residue diet.  Avoid: high fiber (insoluble fiber), red meats, dairy, and non-digestible solids (peels, fruit pulp, etc). Avoid NSAIDs and narcotics.  - schedule EGD, discussed procedure with patient, including risks and benefits, patient verbalized understanding  -Discussed about possible medical therapy and discussed that this would be managed by one of the GI doctors, lastly possible referral to general surgery for surgical options, patient verbalized understanding     Weight loss  - schedule EGD, discussed procedure with patient, including risks and benefits, patient verbalized understanding  - schedule Colonoscopy, discussed procedure with the patient, including risks and benefits, patient  verbalized understanding  -     IgA; Future; Expected date: 05/27/2024  -     Tissue Transglutaminase, IgA; Future; Expected date: 05/27/2024  - encouraged PO intake and daily calorie counts to ensure adequate nutrition is taken in, recommend at least 2,000 calories a day  - recommend nutritional drinks, such as Boost, Ensure or Glucerna, to supplement nutrition needs     Upper abdominal pain  - schedule EGD, discussed procedure with patient, including risks and benefits, patient verbalized understanding     Gastroesophageal reflux disease with esophagitis without hemorrhage  - schedule EGD, discussed procedure with patient, including risks and benefits, patient verbalized understanding  - CONTINUE PROTONIX 40 MG ONCE DAILY AS DIRECTED     S/P cholecystectomy     Pharyngoesophageal dysphagia  - schedule EGD, discussed procedure with patient and possible esophageal dilation may be performed during procedure if indicated, patient verbalized understanding  - recommend to eat smaller more frequent meals and to eat slowly and advised to eat a soft diet. Take medications one at a time with a full glass of water.  - possible UGI/esophagram/esophageal manometry if symptoms persist     Constipation, unspecified constipation type  -   START  polyethylene glycol (GLYCOLAX) 17 gram/dose powder; Take 17 g by mouth once daily. Mix with 8 oz of water/liquid.  Dispense: 510 g; Refill: 2  - Recommend daily exercise as tolerated & adequate water intake (six 8-oz glasses of water daily)  -Recommend taking an OTC stool softener such as Colace as directed to avoid hard stools and straining with bowel movements PRN  - If no improvement with above recommendations, try intermittently dosed Dulcolax OTC as directed (every 3-4   days) PRN to facilitate bowel movements  -If still no improvement with these measures, call/follow-up     Chills & History of dizziness  - Recommend follow-up with Primary Care Provider for continued evaluation and  management.     Follow up in about 1 month (around 1/10/2025), or if symptoms worsen or fail to improve, for FOLLOW-UP WITH GI PHYSICIAN FOR CONTINUED EVALUATION AND MANAGEMENT.                See last Upper GI endoscopy 5/22/2024 :  Indications:           Epigastric abdominal pain, Heartburn, Nausea with vomiting   Providers:             Ean Rojsa MD   Impression:     - LA Grade C reflux esophagitis with no bleeding.    Biopsied.                          - Small hiatal hernia.                          - Gastritis. Biopsied.                          - Normal examined duodenum.   Recommendation:        - Await pathology results.                          - Return patient to hospital [facility] for ongoing care.                          - Clear liquid diet.                          - Protonix/carafate.   Ean Rojas MD   5/22/2024     1. STOMACH, ANTRUM, BIOPSY:   - GASTRIC ANTRAL MUCOSA WITH REACTIVE GASTROPATHY.   - NEGATIVE FOR HELICOBACTER PYLORI TYPE ORGANISMS.   2. DISTAL ESOPHAGUS, BIOPSY:   - SQUAMOUS MUCOSA WITH REFLUX ESOPHAGITIS AND ULCERATION.   - NEGATIVE FOR VIRAL CYTOPATHIC EFFECT AND FUNGAL ORGANISMS BY H and E. _      PTA Medications   Medication Sig    dextroamphetamine-amphetamine (ADDERALL) 20 mg tablet Take 30 mg by mouth 3 (three) times daily.    acetaminophen (TYLENOL) 325 MG tablet Take 650 mg by mouth every 6 (six) hours as needed for Pain.    albuterol (PROVENTIL/VENTOLIN HFA) 90 mcg/actuation inhaler INHALE TWO PUFFS BY MOUTH EVERY SIX HOURS AS NEEDED FOR WHEEZING.    drospirenone, contraceptive, (SLYND) 4 mg (28) Tab Take 1 tablet (4 mg total) by mouth once daily.    fluticasone-salmeterol diskus inhaler 250-50 mcg Inhale 1 puff into the lungs 2 (two) times daily. Controller    meclizine (ANTIVERT) 25 mg tablet Take 1 tablet (25 mg total) by mouth 3 (three) times daily as needed for Dizziness.    mupirocin (BACTROBAN) 2 % ointment Apply topically 3 (three) times daily.     ondansetron (ZOFRAN-ODT) 4 MG TbDL Take 1 tablet (4 mg total) by mouth every 12 (twelve) hours as needed (nuasea).    pantoprazole (PROTONIX) 40 MG tablet Take 1 tablet (40 mg total) by mouth once daily.    polyethylene glycol (GLYCOLAX) 17 gram/dose powder Take 17 g by mouth once daily. Mix with 8 oz of water/liquid.    promethazine (PHENERGAN) 25 MG suppository 30       Review of patient's allergies indicates:   Allergen Reactions    Dilaudid [hydromorphone]      burning    Latex, natural rubber     Plum     Sulfa (sulfonamide antibiotics)         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 ENDO;  Service: Gastroenterology;  Laterality: N/A;    LAPAROSCOPIC CHOLECYSTECTOMY N/A 5/31/2024    Procedure: CHOLECYSTECTOMY, LAPAROSCOPIC;  Surgeon: Heriberto Motta MD;  Location: [facility] OR;  Service: General;  Laterality: N/A;    TONSILLECTOMY  07/01/2014     Family History   Problem Relation Name Age of Onset    Migraines Mother      Diverticulitis Father      Stroke Maternal Grandmother      Migraines Maternal Grandmother      Gallbladder disease Maternal Grandmother      Hearing loss Maternal Grandfather      Stroke Maternal Grandfather      Hypertension Maternal Grandfather      Colon cancer Neg Hx      Ulcerative  "colitis Neg Hx      Stomach cancer Neg Hx      Esophageal cancer Neg Hx      Crohn's disease Neg Hx       Social History[1]      OBJECTIVE:     Vital Signs (Most Recent)  Temp: 97.7 °F (36.5 °C) (03/19/25 1102)  Pulse: 75 (03/19/25 1102)  Resp: 18 (03/19/25 1102)  BP: 120/77 (03/19/25 1102)  SpO2: 98 % (03/19/25 1102)    Physical Exam:  : Ht: 5' 5" (165.1 cm)   Wt: 101.6 kg   BMI: 37.28 kg/m²   .                                                      GENERAL:  Comfortable, in no acute distress.                                 HEENT EXAM:  Nonicteric.  No adenopathy.  Oropharynx is clear.               NECK:  Supple.                                                               LUNGS:  Clear.                                                               CARDIAC:  Regular rate and rhythm.  S1, S2.  No murmur.                      ABDOMEN:  Obese.  Soft, positive bowel sounds, nontender.  No hepatosplenomegaly or masses.  No rebound or guarding.                                             EXTREMITIES:  No edema.     MENTAL STATUS:  Alert and oriented.    ASSESSMENT/PLAN:     Assessment: Upper abdo pain.  Nausea.  Possible gastroparesis.  Dysphagia.  Constipation.  Weight loss.    Plan: Gastroscopy and Colonoscopy    Anesthesia Plan:   MAC / General Anaesthesia    ASA Grade: ASA 2 - Patient with mild systemic disease with no functional limitations    MALLAMPATI SCORE: II (hard and soft palate, upper portion of tonsils anduvula visible)         [1]   Social History  Tobacco Use    Smoking status: Never    Smokeless tobacco: Never   Substance Use Topics    Alcohol use: Never    Drug use: Yes     Frequency: 2.0 times per week     Types: Marijuana     "

## 2025-03-19 ENCOUNTER — ANESTHESIA (OUTPATIENT)
Dept: ENDOSCOPY | Facility: HOSPITAL | Age: 30
End: 2025-03-19
Payer: COMMERCIAL

## 2025-03-19 ENCOUNTER — HOSPITAL ENCOUNTER (OUTPATIENT)
Facility: HOSPITAL | Age: 30
Discharge: HOME | End: 2025-03-19
Attending: INTERNAL MEDICINE | Admitting: INTERNAL MEDICINE
Payer: COMMERCIAL

## 2025-03-19 VITALS
OXYGEN SATURATION: 99 % | HEART RATE: 78 BPM | RESPIRATION RATE: 16 BRPM | DIASTOLIC BLOOD PRESSURE: 67 MMHG | TEMPERATURE: 98 F | WEIGHT: 224 LBS | SYSTOLIC BLOOD PRESSURE: 128 MMHG | HEIGHT: 65 IN | BODY MASS INDEX: 37.32 KG/M2

## 2025-03-19 DIAGNOSIS — R10.10 UPPER ABDOMINAL PAIN: ICD-10-CM

## 2025-03-19 DIAGNOSIS — K44.9 HIATAL HERNIA: ICD-10-CM

## 2025-03-19 DIAGNOSIS — K21.00 GASTROESOPHAGEAL REFLUX DISEASE WITH ESOPHAGITIS WITHOUT HEMORRHAGE: ICD-10-CM

## 2025-03-19 LAB
B-HCG UR QL: NEGATIVE
CTP QC/QA: YES

## 2025-03-19 PROCEDURE — 43239 EGD BIOPSY SINGLE/MULTIPLE: CPT | Mod: 59,PO | Performed by: INTERNAL MEDICINE

## 2025-03-19 PROCEDURE — 37000009 HC ANESTHESIA EA ADD 15 MINS: Mod: PO | Performed by: INTERNAL MEDICINE

## 2025-03-19 PROCEDURE — 43248 EGD GUIDE WIRE INSERTION: CPT | Mod: PO | Performed by: INTERNAL MEDICINE

## 2025-03-19 PROCEDURE — 81025 URINE PREGNANCY TEST: CPT | Mod: PO | Performed by: INTERNAL MEDICINE

## 2025-03-19 PROCEDURE — 88342 IMHCHEM/IMCYTCHM 1ST ANTB: CPT | Mod: PO | Performed by: PATHOLOGY

## 2025-03-19 PROCEDURE — 43248 EGD GUIDE WIRE INSERTION: CPT | Mod: 51,,, | Performed by: INTERNAL MEDICINE

## 2025-03-19 PROCEDURE — 37000008 HC ANESTHESIA 1ST 15 MINUTES: Mod: PO | Performed by: INTERNAL MEDICINE

## 2025-03-19 PROCEDURE — 63600175 PHARM REV CODE 636 W HCPCS: Mod: PO | Performed by: NURSE ANESTHETIST, CERTIFIED REGISTERED

## 2025-03-19 PROCEDURE — 88305 TISSUE EXAM BY PATHOLOGIST: CPT | Mod: 26,,, | Performed by: PATHOLOGY

## 2025-03-19 PROCEDURE — 27201012 HC FORCEPS, HOT/COLD, DISP: Mod: PO | Performed by: INTERNAL MEDICINE

## 2025-03-19 PROCEDURE — 43239 EGD BIOPSY SINGLE/MULTIPLE: CPT | Mod: 59,,, | Performed by: INTERNAL MEDICINE

## 2025-03-19 PROCEDURE — 88342 IMHCHEM/IMCYTCHM 1ST ANTB: CPT | Mod: 26,,, | Performed by: PATHOLOGY

## 2025-03-19 PROCEDURE — 88305 TISSUE EXAM BY PATHOLOGIST: CPT | Mod: 59,PO | Performed by: PATHOLOGY

## 2025-03-19 PROCEDURE — 63600175 PHARM REV CODE 636 W HCPCS: Mod: PO | Performed by: INTERNAL MEDICINE

## 2025-03-19 PROCEDURE — C1769 GUIDE WIRE: HCPCS | Mod: PO | Performed by: INTERNAL MEDICINE

## 2025-03-19 PROCEDURE — 45380 COLONOSCOPY AND BIOPSY: CPT | Mod: ,,, | Performed by: INTERNAL MEDICINE

## 2025-03-19 PROCEDURE — 45380 COLONOSCOPY AND BIOPSY: CPT | Mod: PO | Performed by: INTERNAL MEDICINE

## 2025-03-19 RX ORDER — LIDOCAINE HYDROCHLORIDE 20 MG/ML
INJECTION INTRAVENOUS
Status: DISCONTINUED | OUTPATIENT
Start: 2025-03-19 | End: 2025-03-19

## 2025-03-19 RX ORDER — PROPOFOL 10 MG/ML
VIAL (ML) INTRAVENOUS CONTINUOUS PRN
Status: DISCONTINUED | OUTPATIENT
Start: 2025-03-19 | End: 2025-03-19

## 2025-03-19 RX ORDER — SODIUM CHLORIDE, SODIUM LACTATE, POTASSIUM CHLORIDE, CALCIUM CHLORIDE 600; 310; 30; 20 MG/100ML; MG/100ML; MG/100ML; MG/100ML
INJECTION, SOLUTION INTRAVENOUS CONTINUOUS
Status: DISCONTINUED | OUTPATIENT
Start: 2025-03-19 | End: 2025-03-19 | Stop reason: HOSPADM

## 2025-03-19 RX ORDER — PANTOPRAZOLE SODIUM 40 MG/1
40 TABLET, DELAYED RELEASE ORAL
Qty: 90 TABLET | Refills: 3 | Status: SHIPPED | OUTPATIENT
Start: 2025-03-19

## 2025-03-19 RX ORDER — FENTANYL CITRATE 50 UG/ML
INJECTION, SOLUTION INTRAMUSCULAR; INTRAVENOUS
Status: DISCONTINUED | OUTPATIENT
Start: 2025-03-19 | End: 2025-03-19

## 2025-03-19 RX ORDER — PROPOFOL 10 MG/ML
VIAL (ML) INTRAVENOUS
Status: DISCONTINUED | OUTPATIENT
Start: 2025-03-19 | End: 2025-03-19

## 2025-03-19 RX ORDER — SODIUM CHLORIDE 0.9 % (FLUSH) 0.9 %
10 SYRINGE (ML) INJECTION
Status: DISCONTINUED | OUTPATIENT
Start: 2025-03-19 | End: 2025-03-19 | Stop reason: HOSPADM

## 2025-03-19 RX ORDER — FAMOTIDINE 40 MG/1
40 TABLET, FILM COATED ORAL NIGHTLY
Qty: 90 TABLET | Refills: 3 | Status: SHIPPED | OUTPATIENT
Start: 2025-03-19 | End: 2026-03-19

## 2025-03-19 RX ADMIN — PROPOFOL 30 MG: 10 INJECTION, EMULSION INTRAVENOUS at 11:03

## 2025-03-19 RX ADMIN — PROPOFOL 120 MG: 10 INJECTION, EMULSION INTRAVENOUS at 11:03

## 2025-03-19 RX ADMIN — LIDOCAINE HYDROCHLORIDE 50 MG: 20 INJECTION INTRAVENOUS at 11:03

## 2025-03-19 RX ADMIN — PROPOFOL 50 MG: 10 INJECTION, EMULSION INTRAVENOUS at 11:03

## 2025-03-19 RX ADMIN — PROPOFOL 150 MCG/KG/MIN: 10 INJECTION, EMULSION INTRAVENOUS at 11:03

## 2025-03-19 RX ADMIN — SODIUM CHLORIDE, POTASSIUM CHLORIDE, SODIUM LACTATE AND CALCIUM CHLORIDE: 600; 310; 30; 20 INJECTION, SOLUTION INTRAVENOUS at 11:03

## 2025-03-19 RX ADMIN — FENTANYL CITRATE 100 MCG: 50 INJECTION, SOLUTION INTRAMUSCULAR; INTRAVENOUS at 11:03

## 2025-03-19 NOTE — [employer]ATION PATIENT INSTRUCTIONS
Discharge Summary/Instructions for after Colonoscopy with   Biopsy/Polypectomy  Georgie Dunlap    Wednesday, March 19, 2025  Benson Higgins MD  RESTRICTIONS ON ACTIVITY:  - Do not drive a car or operate machinery until the day after the procedure.      - The following day: return to full activity including work.  - For  3 days: No heavy lifting, straining or running.  - Diet: You can have solid foods, but no gassy foods (i.e. beans, broccoli,   cabbage, etc).  TREATMENT FOR COMMON SIDE EFFECTS:  - Mild abdominal pain and bloating or excessive gas: rest, eat lightly and   use a heating pad.  SYMPTOMS TO WATCH FOR AND REPORT TO YOUR PHYSICIAN:  1. Severe abdominal pain.  2. Fever within 24 hours after a procedure.  3. A large amount of rectal bleeding. (A small amount of blood from the   rectum is not serious, especially if hemorrhoids are present.  3.  Because air was put into your colon during the procedure, expelling   large amounts of air from your rectum is normal.  4.  You may not have a bowel movement for 1-3 days because of the   colonoscopy prep.  This is normal.  5.  Call immediately if you notice any of the following:   Chills and/or fever over 101   Persistent vomiting   Severe abdominal pain, other than gas cramps   Severe chest pain   Black, tarry stools   Any bleeding - exceeding one tablespoon  Your doctor recommends these additional instructions:  We are waiting for your pathology results.   If the pathology report reveals adenomatous (precancerous) tissue, then your   physician recommends a repeat colonoscopy for surveillance in 5 years.   If the pathology report indicates a hyperplastic polyp, then the colonoscopy   will be repeated for surveillance in 8-10 years.   Eat a high fiber diet.   Continue your present medications.   Take Miralax 1 capful (17 grams) in 8 ounces of water by mouth once a day as   needed.   Return to your GI clinic in 4-6 weeks.  None  If you have any questions or  problems, please call your physician.  EMERGENCY PHONE NUMBER: [phone]  LAB RESULTS: Call in two (2) weeks for lab results, [phone]  ___________________________________________  Nurse Signature  ___________________________________________  Patient/Designated Responsible Party Signature  Benson Higgins MD  3/19/2025 1:17:51 PM  This report has been verified and signed electronically.  Dear patient,  As a result of recent federal legislation (The Federal Cures Act), you may   receive lab or pathology results from your procedure in your [facility]   account before your physician is able to contact you. Your physician or   their representative will relay the results to you with their   recommendations at their soonest availability.  Thank you.  [employer]ATION

## 2025-03-19 NOTE — TRANSFER OF CARE
"Anesthesia Transfer of Care Note    Patient: Georgie Dunlap    Procedure(s) Performed: Procedure(s) (LRB):  EGD (ESOPHAGOGASTRODUODENOSCOPY) (N/A)  COLONOSCOPY (N/A)    Patient location: PACU    Anesthesia Type: general    Transport from OR: Transported from OR on room air with adequate spontaneous ventilation    Post pain: adequate analgesia    Post assessment: no apparent anesthetic complications and tolerated procedure well    Post vital signs: stable    Level of consciousness: awake    Nausea/Vomiting: no nausea/vomiting    Complications: none    Transfer of care protocol was followed      Last vitals: Visit Vitals  /77 (BP Location: Left arm, Patient Position: Sitting)   Pulse 75   Temp 36.5 °C (97.7 °F) (Temporal)   Resp 18   Ht 5' 5" (1.651 m)   Wt 101.6 kg (224 lb)   LMP 03/16/2025   SpO2 98%   Breastfeeding No   BMI 37.28 kg/m²     "

## 2025-03-19 NOTE — ANESTHESIA POSTPROCEDURE EVALUATION
Anesthesia Post Evaluation    Patient: Georgie Dunlap    Procedure(s) Performed: Procedure(s) (LRB):  EGD (ESOPHAGOGASTRODUODENOSCOPY) (N/A)  COLONOSCOPY (N/A)    Final Anesthesia Type: general      Patient location during evaluation: PACU  Patient participation: Yes- Able to Participate  Level of consciousness: awake  Post-procedure vital signs: reviewed and stable  Pain management: adequate  Airway patency: patent    PONV status at discharge: No PONV  Anesthetic complications: no      Cardiovascular status: blood pressure returned to baseline  Respiratory status: unassisted  Hydration status: euvolemic  Follow-up not needed.              Vitals Value Taken Time   /67 03/19/25 13:00   Temp 36.4 °C (97.5 °F) 03/19/25 12:32   Pulse 78 03/19/25 13:00   Resp 16 03/19/25 13:00   SpO2 99 % 03/19/25 13:00         Event Time   Out of Recovery 13:25:07         Pain/Adela Score: Adela Score: 10 (3/19/2025  1:00 PM)

## 2025-03-19 NOTE — PROVATION PATIENT INSTRUCTIONS
Discharge Summary/Instructions after an Endoscopic Procedure  Patient Name: Georgie Dunlap  Patient MRN: [id number]  Patient YOB: 1995 Wednesday, March 19, 2025  Benson Higgins MD  Dear patient,  As a result of recent federal legislation (The Federal Cures Act), you may   receive lab or pathology results from your procedure in your [facility]   account before your physician is able to contact you. Your physician or   their representative will relay the results to you with their   recommendations at their soonest availability.  Thank you,  RESTRICTIONS:  During your procedure today, you received medications for sedation.  These   medications may affect your judgment, balance and coordination.  Therefore,   for 24 hours, you have the following restrictions:   - DO NOT drive a car, operate machinery, make legal/financial decisions,   sign important papers or drink alcohol.    ACTIVITY:  Today: no heavy lifting, straining or running due to procedural   sedation/anesthesia.  The following day: return to full activity including work.  DIET:  Eat and drink normally unless instructed otherwise.     TREATMENT FOR COMMON SIDE EFFECTS:  - Mild abdominal pain, nausea, belching, bloating or excessive gas:  rest,   eat lightly and use a heating pad.  - Sore Throat: treat with throat lozenges and/or gargle with warm salt   water.  - Because air was used during the procedure, expelling large amounts of air   from your rectum or belching is normal.  - If a bowel prep was taken, you may not have a bowel movement for 1-3 days.    This is normal.  SYMPTOMS TO WATCH FOR AND REPORT TO YOUR PHYSICIAN:  1. Abdominal pain or bloating, other than gas cramps.  2. Chest pain.  3. Back pain.  4. Signs of infection such as: chills or fever occurring within 24 hours   after the procedure.  5. Rectal bleeding, which would show as bright red, maroon, or black stools.   (A tablespoon of blood from the rectum is not serious,  especially if   hemorrhoids are present.)  6. Vomiting.  7. Weakness or dizziness.  GO DIRECTLY TO THE NEAREST EMERGENCY ROOM IF YOU HAVE ANY OF THE FOLLOWING:      Difficulty breathing              Chills and/or fever over 101 F   Persistent vomiting and/or vomiting blood   Severe abdominal pain   Severe chest pain   Black, tarry stools   Bleeding- more than one tablespoon   Any other symptom or condition that you feel may need urgent attention  Your doctor recommends these additional instructions:  If any biopsies were taken, your doctors clinic will contact you in 1 to 2   weeks with any results.  Follow an antireflux regimen.  This includes:       - Do not lie down for at least 3 to 4 hours after meals.        - Raise the head of the bed 4 to 6 inches.        - Decrease excess weight.        - Avoid citrus juices and other acidic foods, alcohol, chocolate, mints,   coffee and other caffeinated beverages, carbonated beverages, fatty and   fried foods.        - Avoid tight-fitting clothing.        - Avoid cigarettes and other tobacco products.   Especially:   Exercise and weight loss  Continue your present medications.   Take Protonix (pantoprazole) 40 mg by mouth once a day.   Add evening dose of Pepcid (famotidine) 40 mg by mouth about an hour before   bedtime.   Return to your GI clinic in 4-6 weeks.   Consider ENT evaluation.  For questions, problems or results please call your physician - Benson Higgins MD at Work:  [phone].  EMERGENCY PHONE NUMBER: [phone], LAB RESULTS: [phone]  IF A COMPLICATION OR EMERGENCY SITUATION ARISES AND YOU ARE UNABLE TO REACH   YOUR PHYSICIAN - GO DIRECTLY TO THE EMERGENCY ROOM.  ___________________________________________  Nurse Signature  ___________________________________________  Patient/Designated Responsible Party Signature  Benson Higgins MD  3/19/2025 1:03:34 PM  This report has been verified and signed electronically.  Dear patient,  As a  result of recent federal legislation (The Federal Cures Act), you may   receive lab or pathology results from your procedure in your [facility]   account before your physician is able to contact you. Your physician or   their representative will relay the results to you with their   recommendations at their soonest availability.  Thank you.  PROVATION

## 2025-03-19 NOTE — ANESTHESIA PREPROCEDURE EVALUATION
03/19/2025  Georgie Dunlap is a 29 y.o., female.      Pre-op Assessment    I have reviewed the Patient Summary Reports.    I have reviewed the NPO Status.   I have reviewed the Medications.     Review of Systems  Anesthesia Hx:  No problems with previous Anesthesia                Social:  Non-Smoker       Hematology/Oncology:                   Hematology Comments: Chronic DVT (no current anticoagulation)                    Cardiovascular:  Cardiovascular Normal                                              Pulmonary:    Asthma       Asthma:               Hepatic/GI:     GERD         Gerd          Musculoskeletal:     Fibromyalgia            OB/GYN/PEDS:  PCOS           Neurological:    Neuromuscular Disease,                                 Neuromuscular Disease   Endocrine:        Obesity / BMI > 30  Psych:  Psychiatric History anxiety depression ADHD               Physical Exam  General: Well nourished    Airway:  Mallampati: II   Mouth Opening: Normal  TM Distance: Normal  Neck ROM: Normal ROM    Dental:  Intact        Anesthesia Plan  Type of Anesthesia, risks & benefits discussed:    Anesthesia Type: Gen Natural Airway  Intra-op Monitoring Plan: Standard ASA Monitors  Induction:  IV  Informed Consent: Informed consent signed with the Patient and all parties understand the risks and agree with anesthesia plan.  All questions answered.   ASA Score: 2    Ready For Surgery From Anesthesia Perspective.     .

## 2025-03-19 NOTE — DISCHARGE INSTRUCTIONS
Recovery After Procedural Sedation (Adult)   You have been given medicine by vein to make you sleep during your surgery. This may have included both a pain medicine and sleeping medicine. Most of the effects have worn off. But you may still have some drowsiness for the next 6 to 8 hours.  Home care  Follow these guidelines when you get home:  For the next 8 hours, you should be watched by a responsible adult. This person should make sure your condition is not getting worse.  Don't drink any alcohol for the next 24 hours.  Don't drive, operate dangerous machinery, or make important business or personal decisions during the next 24 hours.  To prevent injury or falls, use caution when standing and walking for at least 24 hours after your procedure.  Note: Your healthcare provider may tell you not to take any medicine by mouth for pain or sleep in the next 4 hours. These medicines may react with the medicines you were given in the hospital. This could cause a much stronger response than usual.  Follow-up care  Follow up with your healthcare provider if you are not alert and back to your usual level of activity within 12 hours.  When to seek medical advice  Call your healthcare provider right away if any of these occur:  Drowsiness gets worse  Weakness or dizziness gets worse  Repeated vomiting  You can't be awakened  Fever  New rash  [employer] last reviewed this educational content on 9/1/2019  © 1762-1556 [employer]. [address] [place] [address], [place] [address]. All rights reserved. This information is not intended as a substitute for professional medical care. Always follow your healthcare professional's instructions         Tips to Control Acid Reflux    To control acid reflux, youll need to make some basic diet and lifestyle changes. The simple steps outlined below may be all youll need to ease discomfort.  Watch what you eat  Avoid fatty foods and spicy foods.  Eat fewer acidic foods, such as citrus  and tomato-based foods. These can increase symptoms.  Limit drinking alcohol, caffeine, and fizzy beverages. All increase acid reflux.  Try limiting chocolate, peppermint, and spearmint. These can worsen acid reflux in some people.  Watch when you eat  Avoid lying down for 3 hours after eating.  Do not snack before going to bed.  Raise your head  Raising your head and upper body by 4 to 6 inches helps limit reflux when youre lying down. Put blocks under the head of your bed frame to raise it.  Other changes  Lose weight, if you need to  Dont exercise near bedtime  Avoid tight-fitting clothes  Limit aspirin and ibuprofen  Stop smoking   Date Last Reviewed: 7/1/2016  © 8052-3083 [employer]. [address] [place] [address], [place] [address]. All rights reserved. This information is not intended as a substitute for professional medical care. Always follow your healthcare professional's instructions.         High-Fiber Diet  Fiber is in fruits, vegetables, cereals, and grains. Fiber passes through your body undigested. A high-fiber diet helps food move through your intestinal tract. The added bulk is helpful in preventing constipation. In people with diverticulosis, fiber helps clean out the pouches along the colon wall. It also prevents new pouches from forming. A high-fiber diet reduces the risk of colon cancer. It also lowers blood cholesterol and prevents high blood sugar in people with diabetes.    The fiber-rich foods listed below should be part of your diet. If you are not used to high-fiber foods, start with 1 or 2 foods from this list. Every 3 to 4 days add a new one to your diet. Do this until you are eating 4 high-fiber foods per day. This should give you 20 to 35 grams of fiber a day. It is also important to drink a lot of water when you are on this diet. You should have 6 to 8 glasses of water a day. Water makes the fiber swell and increases the benefit.  Foods high in dietary fiber  The following  foods are high in dietary fiber:  Breads. Breads made with 100% whole-wheat flour; nehal, wheat, or rye crackers; whole-grain tortillas, bran muffins.  Cereals. Whole-grain and bran cereals with bran (shredded wheat, wheat flakes, raisin bran, corn bran); oatmeal, rolled oats, granola, and brown rice.  Fruits. Fresh fruits and their edible skins (pears, prunes, raisins, berries, apples, and apricots); bananas, citrus fruit, mangoes, pineapple; and prune juice.  Nuts. Any nuts and seeds.  Vegetables. Best served raw or lightly cooked. All types, especially: green peas, celery, eggplant, potatoes, spinach, broccoli, [place] sprouts, winter squash, carrots, cauliflower, soybeans, lentils, and fresh and dried beans of all kinds.  Other. Popcorn, any spices.  Date Last Reviewed: 8/1/2016  © 5362-5165 [employer]. [address] [place] [address]. All rights reserved. This information is not intended as a substitute for professional medical care. Always follow your healthcare professional's instructions.

## 2025-03-19 NOTE — BRIEF OP NOTE
Discharge Note  Short Stay      SUMMARY     Admit Date: 3/19/2025    Attending Physician: Benson Higgins Jr., MD     Discharge Physician: Benson Higgins Jr., MD    Discharge Date: 3/19/2025 1:21 PM    Final Diagnosis: Nausea [R11.0]  History of gastroesophageal reflux (GERD) [Z87.19]  Dysphagia, unspecified type [R13.10]  Constipation, unspecified constipation type [K59.00]      Impression:            - The tonsils are large.                          - Normal oropharynx.                          - Normal larynx.                          - Normal cricopharyngeus.                          - Normal upper third of esophagus and middle third                          of esophagus.                          - LA Grade A reflux esophagitis with no bleeding.                          Biopsied.                          - Z-line irregular, 36 cm from the incisors.                          - Patulous lower esophageal sphincter.                          - Clear gastric fluid.                          - Gastritis.                          - Gastritis. Biopsied.                          - Normal pylorus.                          - Normal examined duodenum.                          - Normal major papilla.                          - No endoscopic esophageal abnormality to explain                          patient's dysphagia. Esophagus dilated. Dilated.   Recommendation:        - Perform a colonoscopy as previously scheduled.                          - Discharge patient to home.                          - Await pathology results.                          - Observe patient's clinical course following                          today's procedure with therapeutic intervention.                          - Follow an antireflux regimen.                          - Continue present medications.                          - Use Protonix (pantoprazole) 40 mg PO daily.                          - Use Pepcid (famotidine) 40 mg PO daily.                           - Call the G.I[facility] clinic in 2 weeks for reports (if                          you haven't heard from us sooner) [phone].                          - Return to GI clinic in 4 weeks.                          Impression:            - The anus is normal.                          - Non-bleeding internal hemorrhoids.                          - The rectum is normal.                          - Congested mucosa in the proximal rectum and in                          the sigmoid colon. Biopsied.                          - One 2 mm polyp in the proximal sigmoid colon,                          removed with a cold biopsy forceps. Resected and                          retrieved.                          - The examination was otherwise normal.                          - The entire examined colon is normal.                          - The examined portion of the ileum was normal.   Recommendation:        - Discharge patient to home.                          - Await pathology results.                          - If the pathology report reveals adenomatous                          tissue, then repeat the colonoscopy for                          surveillance in 5 years.                          - If the pathology report indicates hyperplastic                          polyp, then repeat colonoscopy for surveillance in                          8 years.                          - Continue present medications.                          - Miralax 1 capful (17 grams) in 8 ounces of water                          PO daily PRN.                          - Use Linzess (linaclotide) 145 mcg PO daily.                          - Call the G.I[facility] clinic in 2 weeks for reports (if                          you haven't heard from us sooner) [phone].     Benson Higgins MD   3/19/2025         Disposition: HOME OR SELF CARE    Patient Instructions:   Current Discharge Medication List        START taking these medications    Details    famotidine (PEPCID) 40 MG tablet Take 1 tablet (40 mg total) by mouth every evening. About an hour before bedtime.  Qty: 90 tablet, Refills: 3           CONTINUE these medications which have CHANGED    Details   pantoprazole (PROTONIX) 40 MG tablet Take 1 tablet (40 mg total) by mouth before breakfast.  Qty: 90 tablet, Refills: 3    Associated Diagnoses: Hiatal hernia; Gastroesophageal reflux disease with esophagitis without hemorrhage           CONTINUE these medications which have NOT CHANGED    Details   dextroamphetamine-amphetamine (ADDERALL) 20 mg tablet Take 30 mg by mouth 3 (three) times daily.      acetaminophen (TYLENOL) 325 MG tablet Take 650 mg by mouth every 6 (six) hours as needed for Pain.      albuterol (PROVENTIL/VENTOLIN HFA) 90 mcg/actuation inhaler INHALE TWO PUFFS BY MOUTH EVERY SIX HOURS AS NEEDED FOR WHEEZING.  Qty: 18 g, Refills: 3    Associated Diagnoses: Moderate persistent asthma without complication      drospirenone, contraceptive, (SLYND) 4 mg (28) Tab Take 1 tablet (4 mg total) by mouth once daily.  Qty: 28 tablet, Refills: 11    Associated Diagnoses: PCOS (polycystic ovarian syndrome)      fluticasone-salmeterol diskus inhaler 250-50 mcg Inhale 1 puff into the lungs 2 (two) times daily. Controller  Qty: 60 each, Refills: 3    Associated Diagnoses: Moderate persistent asthma without complication      meclizine (ANTIVERT) 25 mg tablet Take 1 tablet (25 mg total) by mouth 3 (three) times daily as needed for Dizziness.  Qty: 30 tablet, Refills: 0    Associated Diagnoses: Dizziness      mupirocin (BACTROBAN) 2 % ointment Apply topically 3 (three) times daily.  Qty: 30 g, Refills: 0    Associated Diagnoses: Skin breakdown      ondansetron (ZOFRAN-ODT) 4 MG TbDL Take 1 tablet (4 mg total) by mouth every 12 (twelve) hours as needed (nuasea).  Qty: 20 tablet, Refills: 1    Associated Diagnoses: Nausea      polyethylene glycol (GLYCOLAX) 17 gram/dose powder Take 17 g by mouth once daily. Mix  with 8 oz of water/liquid.  Qty: 510 g, Refills: 2    Associated Diagnoses: Constipation, unspecified constipation type      promethazine (PHENERGAN) 25 MG suppository 30             Discharge Procedure Orders (must include Diet, Follow-up, Activity)    Follow Up:  Follow up with PCP as per your routine.  Please follow an anti reflux diet and a high fiber diet.  Activity as tolerated.    No driving day of procedure.

## 2025-03-21 ENCOUNTER — TELEPHONE (OUTPATIENT)
Dept: GASTROENTEROLOGY | Facility: CLINIC | Age: 30
End: 2025-03-21
Payer: COMMERCIAL

## 2025-03-21 NOTE — TELEPHONE ENCOUNTER
Called and set up f/u appointment per recommendations of Dr. Higgins with Diane Mccrary NP, patient verbalized understanding of this.

## 2025-03-27 LAB
FINAL PATHOLOGIC DIAGNOSIS: NORMAL
GROSS: NORMAL
Lab: NORMAL
MICROSCOPIC EXAM: NORMAL

## 2025-04-03 ENCOUNTER — RESULTS FOLLOW-UP (OUTPATIENT)
Dept: GASTROENTEROLOGY | Facility: HOSPITAL | Age: 30
End: 2025-04-03

## 2025-05-30 ENCOUNTER — OFFICE VISIT (OUTPATIENT)
Dept: GASTROENTEROLOGY | Facility: CLINIC | Age: 30
End: 2025-05-30
Payer: COMMERCIAL

## 2025-05-30 VITALS — BODY MASS INDEX: 39.15 KG/M2 | HEIGHT: 65 IN | WEIGHT: 235 LBS

## 2025-05-30 DIAGNOSIS — K59.00 CONSTIPATION, UNSPECIFIED CONSTIPATION TYPE: ICD-10-CM

## 2025-05-30 DIAGNOSIS — Z90.89 HISTORY OF TONSILLECTOMY: ICD-10-CM

## 2025-05-30 DIAGNOSIS — K31.84 GASTROPARESIS: ICD-10-CM

## 2025-05-30 DIAGNOSIS — Z98.890 HISTORY OF COLONOSCOPY: ICD-10-CM

## 2025-05-30 DIAGNOSIS — R13.14 PHARYNGOESOPHAGEAL DYSPHAGIA: ICD-10-CM

## 2025-05-30 DIAGNOSIS — R10.10 UPPER ABDOMINAL PAIN: ICD-10-CM

## 2025-05-30 DIAGNOSIS — R11.2 NON-INTRACTABLE VOMITING WITH NAUSEA: ICD-10-CM

## 2025-05-30 DIAGNOSIS — K21.00 GASTROESOPHAGEAL REFLUX DISEASE WITH ESOPHAGITIS WITHOUT HEMORRHAGE: ICD-10-CM

## 2025-05-30 DIAGNOSIS — J35.1 ENLARGED TONSILS: ICD-10-CM

## 2025-05-30 DIAGNOSIS — K31.A0 INTESTINAL METAPLASIA OF GASTRIC MUCOSA: ICD-10-CM

## 2025-05-30 DIAGNOSIS — Z98.890 HISTORY OF ESOPHAGOGASTRODUODENOSCOPY (EGD): Primary | ICD-10-CM

## 2025-05-30 DIAGNOSIS — Z87.898 HISTORY OF DIZZINESS: ICD-10-CM

## 2025-05-30 PROCEDURE — 99999 PR PBB SHADOW E&M-EST. PATIENT-LVL V: CPT | Mod: PBBFAC,,, | Performed by: NURSE PRACTITIONER

## 2025-05-30 PROCEDURE — 1159F MED LIST DOCD IN RCRD: CPT | Mod: CPTII,S$GLB,, | Performed by: NURSE PRACTITIONER

## 2025-05-30 PROCEDURE — 1160F RVW MEDS BY RX/DR IN RCRD: CPT | Mod: CPTII,S$GLB,, | Performed by: NURSE PRACTITIONER

## 2025-05-30 PROCEDURE — 99214 OFFICE O/P EST MOD 30 MIN: CPT | Mod: S$GLB,,, | Performed by: NURSE PRACTITIONER

## 2025-05-30 PROCEDURE — 3008F BODY MASS INDEX DOCD: CPT | Mod: CPTII,S$GLB,, | Performed by: NURSE PRACTITIONER

## 2025-05-30 RX ORDER — ONDANSETRON 4 MG/1
4 TABLET, ORALLY DISINTEGRATING ORAL EVERY 12 HOURS PRN
Qty: 30 TABLET | Refills: 2 | Status: SHIPPED | OUTPATIENT
Start: 2025-05-30

## 2025-07-11 ENCOUNTER — TELEPHONE (OUTPATIENT)
Dept: NEUROLOGY | Facility: CLINIC | Age: 30
End: 2025-07-11
Payer: COMMERCIAL

## 2025-07-11 ENCOUNTER — OFFICE VISIT (OUTPATIENT)
Dept: NEUROLOGY | Facility: CLINIC | Age: 30
End: 2025-07-11
Payer: COMMERCIAL

## 2025-07-11 ENCOUNTER — LAB VISIT (OUTPATIENT)
Dept: LAB | Facility: HOSPITAL | Age: 30
End: 2025-07-11
Payer: COMMERCIAL

## 2025-07-11 VITALS
DIASTOLIC BLOOD PRESSURE: 83 MMHG | WEIGHT: 234.13 LBS | BODY MASS INDEX: 39.01 KG/M2 | HEIGHT: 65 IN | OXYGEN SATURATION: 100 % | SYSTOLIC BLOOD PRESSURE: 147 MMHG | HEART RATE: 97 BPM

## 2025-07-11 DIAGNOSIS — R55 POSTURAL DIZZINESS WITH PRESYNCOPE: Primary | ICD-10-CM

## 2025-07-11 DIAGNOSIS — R42 DIZZINESS AND GIDDINESS: ICD-10-CM

## 2025-07-11 DIAGNOSIS — R42 POSTURAL DIZZINESS WITH PRESYNCOPE: ICD-10-CM

## 2025-07-11 DIAGNOSIS — R42 POSTURAL DIZZINESS WITH PRESYNCOPE: Primary | ICD-10-CM

## 2025-07-11 DIAGNOSIS — F41.9 ANXIETY AND DEPRESSION: Chronic | ICD-10-CM

## 2025-07-11 DIAGNOSIS — R55 POSTURAL DIZZINESS WITH PRESYNCOPE: ICD-10-CM

## 2025-07-11 DIAGNOSIS — F51.05 INSOMNIA DUE TO OTHER MENTAL DISORDER: ICD-10-CM

## 2025-07-11 DIAGNOSIS — F99 INSOMNIA DUE TO OTHER MENTAL DISORDER: ICD-10-CM

## 2025-07-11 DIAGNOSIS — F32.A ANXIETY AND DEPRESSION: Chronic | ICD-10-CM

## 2025-07-11 LAB
ALBUMIN SERPL BCP-MCNC: 3.9 G/DL (ref 3.5–5.2)
ALP SERPL-CCNC: 62 UNIT/L (ref 40–150)
ALT SERPL W/O P-5'-P-CCNC: 29 UNIT/L (ref 10–44)
ANION GAP (OHS): 11 MMOL/L (ref 8–16)
AST SERPL-CCNC: 15 UNIT/L (ref 11–45)
BILIRUB SERPL-MCNC: 0.3 MG/DL (ref 0.1–1)
BUN SERPL-MCNC: 15 MG/DL (ref 6–20)
CALCIUM SERPL-MCNC: 8.9 MG/DL (ref 8.7–10.5)
CHLORIDE SERPL-SCNC: 106 MMOL/L (ref 95–110)
CO2 SERPL-SCNC: 26 MMOL/L (ref 23–29)
CREAT SERPL-MCNC: 0.9 MG/DL (ref 0.5–1.4)
CRP SERPL-MCNC: 4.5 MG/L
ERYTHROCYTE [SEDIMENTATION RATE] IN BLOOD BY PHOTOMETRIC METHOD: 22 MM/HR
GFR SERPLBLD CREATININE-BSD FMLA CKD-EPI: >60 ML/MIN/1.73/M2
GLUCOSE SERPL-MCNC: 87 MG/DL (ref 70–110)
POTASSIUM SERPL-SCNC: 3.9 MMOL/L (ref 3.5–5.1)
PROT SERPL-MCNC: 7.6 GM/DL (ref 6–8.4)
SODIUM SERPL-SCNC: 143 MMOL/L (ref 136–145)
TSH SERPL-ACNC: 0.89 UIU/ML (ref 0.4–4)

## 2025-07-11 PROCEDURE — 84443 ASSAY THYROID STIM HORMONE: CPT

## 2025-07-11 PROCEDURE — 99999 PR PBB SHADOW E&M-EST. PATIENT-LVL V: CPT | Mod: PBBFAC,,,

## 2025-07-11 PROCEDURE — 85652 RBC SED RATE AUTOMATED: CPT

## 2025-07-11 PROCEDURE — 86140 C-REACTIVE PROTEIN: CPT

## 2025-07-11 PROCEDURE — 82947 ASSAY GLUCOSE BLOOD QUANT: CPT

## 2025-07-11 PROCEDURE — 36415 COLL VENOUS BLD VENIPUNCTURE: CPT

## 2025-07-11 PROCEDURE — 84480 ASSAY TRIIODOTHYRONINE (T3): CPT

## 2025-07-11 PROCEDURE — 86038 ANTINUCLEAR ANTIBODIES: CPT

## 2025-07-11 PROCEDURE — 84436 ASSAY OF TOTAL THYROXINE: CPT

## 2025-07-11 NOTE — PROGRESS NOTES
[facility] VISIT    Chief Complaint and Duration     Chief Complaint   Patient presents with    Consult    Dizziness     Referred By: LANETTE Young of Dizziness     for 8 months.    History of Present Illness     Georgie Dunlap is a 29 y.o. right handed female with a history of multiple medical diagnoses as listed below that presents for dizziness.     Referral received from gastroenterologist    Significant past medical history of insomnia, ADHD, anxiety depression, asthma, vitamin-D insufficiency, abnormal BMI, GERD, gastritis with intractable nausea and vomiting, fibromyalgia syndrome, chronic pain    Patient presents to clinic visit with spouse    Patient has chronic history with syncope consistent with vasovagal episodes provoked by a cut on her thumb she has cardiac workup to include tilt-table testing suggestive of vasovagal response with nitroglycerin provocation echo normal cardiac function event monitor no arrhythmias patient was found to have tachycardia baseline heart rate in setting of stimulant use for ADHD treatment Cardiology not suggestive of midodrine or fludrocortisone due to elevated blood pressure and heart rate patient will follow up with electrophysiology for 2nd opinion. stress test ordered. Patient does have significant stress at work reported symptoms are exacerbated by anxiety and stimulant use as she is on 30 mg of Adderall t.i.d. cardiology recommended limiting or stopping use of Adderall to follow up PCP and Psychiatry for anxiety controlling medications At follow up with Gastroenterology patient reports vasovagal reactions occurs with abdominal pain and hot showers referral to Neurology and ENT placed from that visit.     At today's visit patient reports a constellation of symptoms.  Onset of dizziness occurred December of last year provoked in aggravated by gastrointestinal pain and a cut patient has sustained to her thumb.  Dizziness is  positional sit-to-stand squat to stand bending causes a sense of disequilibrium that lasts for seconds or up to 1 minute.  Patient denies any involuntary muscle movements, noxious stimuli she states she can feel overall overheated but these symptoms do not occur associated with dizziness episodes.  She also reports overall widespread pain and fatigue.  States has a history of migraines since age of 6 current headaches feels like head heaviness pressure with photophobia and some pain in her jaw.  Patient was previously on sumatriptan she did not like side effects of drowsiness therefore she self-discontinued and takes ibuprofen over-the-counter as needed.  Patient also reports she can sleep headache off.      She reports a fall January of last year when she fell backwards with a head strike did not receive any emergency room care or activation of the EMS system she states she went to chiropractor a week later for back adjustment in the PCP 2 weeks after that cervical spine x-ray completed those images were unremarkable.  Patient reports she followed up with ENT on today audiology exam performed report scanned into patient's media file.  Patient states ENT visit was unremarkable she was told she has tonsil stones.  Patient reports she was diagnosed with fibromyalgia several years ago and has been tried on multiple medications for pain these medications have not been effective in symptom management.  Patient reports current symptoms have affected her quality of life significantly and she has noticed a declined in the last 3-6 months.    Review of patient's allergies indicates:   Allergen Reactions    Dilaudid [hydromorphone]      burning    Latex, natural rubber     Plum     Sulfa (sulfonamide antibiotics)      Current Medications[1]    Medical History     Past Medical History:   Diagnosis Date    Allergic rhinitis     Anxiety and depression     Asthma     Attention deficit hyperactivity disorder (ADHD), predominantly  inattentive type 11/06/2023    SELIN WALKER NP, GERMAN    Chronic deep vein thrombosis (DVT) of popliteal vein of right lower extremity 10/09/2017    Colon polyp     Factor V Leiden     Fibromyalgia     Gastroparesis     GERD (gastroesophageal reflux disease)     Insomnia     Multiple developmental ovarian cysts 10/09/2017    PCOS (polycystic ovarian syndrome)     Sleep apnea     Vitamin D insufficiency      Past Surgical History:   Procedure Laterality Date    COLONOSCOPY N/A 03/19/2025    Procedure: COLONOSCOPY;  Surgeon: Benson Higgins Jr., MD;  Location: [facility];  Service: Endoscopy;  Laterality: N/A; repeat in 5 years for surveillance    ESOPHAGOGASTRODUODENOSCOPY N/A 05/22/2024    Procedure: EGD (ESOPHAGOGASTRODUODENOSCOPY);  Surgeon: Ean Rojas MD;  Location: [facility];  Service: Gastroenterology;  Laterality: N/A;    ESOPHAGOGASTRODUODENOSCOPY N/A 03/19/2025    Procedure: EGD (ESOPHAGOGASTRODUODENOSCOPY);  Surgeon: Benson Higgins Jr., MD;  Location: [facility];  Service: Endoscopy;  Laterality: N/A;    LAPAROSCOPIC CHOLECYSTECTOMY N/A 05/31/2024    Procedure: CHOLECYSTECTOMY, LAPAROSCOPIC;  Surgeon: Heriberto Motta MD;  Location: [facility];  Service: General;  Laterality: N/A;    TONSILLECTOMY  07/01/2014     Family History   Problem Relation Name Age of Onset    Migraines Mother      Diverticulitis Father      Stroke Maternal Grandmother      Migraines Maternal Grandmother      Gallbladder disease Maternal Grandmother      Hearing loss Maternal Grandfather      Stroke Maternal Grandfather      Hypertension Maternal Grandfather      Colon cancer Neg Hx      Ulcerative colitis Neg Hx      Stomach cancer Neg Hx      Esophageal cancer Neg Hx      Crohn's disease Neg Hx       Social History[2]    Exam     Vitals:    07/11/25 1302   BP: (!) 147/83   Pulse: 97      Physical Exam:  General: Not in acute distress. Not ill-appearing.   HENT: Normocephalic and atraumatic. Moist mucous  membranes.  Eyes: Conjunctivae normal.   Pulmonary: Pulmonary effort is normal.   Skin: Skin is warm and dry. No rashes.   Psychiatric:  Anxious, tearful    Neurologic Exam   Mental status: oriented to person, place, and time  Attention: Normal. Concentration: normal.  Speech: speech is normal.  Cranial Nerves: EOMI intact, V1-V3 Facial sensation intact. Symmetric facies. Hearing grossly intact. Palate and uvula midline, symmetric. No tongue deviation. Trapezius strength intact.     Motor exam: bulk and tone normal. Strength 5/5 in bilateral upper extremities: deltoids, biceps, triceps, wrist flexion/extension, finger abduction/adduction. Strength 5/5 in bilateral lower extremities: hip flexion/extension, thigh adduction/abduction, knee flexion/extension, dorsiflexion/plantarflexion, foot eversion/inversion.    Reflexes: 2+ in bilateral upper extremities: biceps and brachiaradialis, 2+ in bilateral lower extremities: patellar and achilles  Plantar reflex: normal  Moran's/Clonus: negative    Sensory exam: pin prick and light touch intact    Gait exam: normal  Romberg: negative  Coordination: normal    Tremor: none  Cogwheel rigidity: none    Labs and Imaging     Labs: reviewed  Lab Results   Component Value Date    TSH 0.885 07/11/2025    TSH 0.455 05/28/2024    HGBA1C 4.8 05/28/2024    LDLCALC 74.6 05/21/2024    [id number] 701 08/18/2021    FOLATE 6.7 08/18/2021     Vit D:  Pending    Imaging:   I have personally reviewed the images performed.     Other procedures: reviewed    Tilt-table 05/08/2025  No hemodynamic changes observed during initial portion of the study syncope was not induced with nitroglycerin provocation although patient did experience presyncopal symptoms suggestive of vasovagal response    Cardiac event monitor 05/06/2025  No clinically significant arrhythmias detected on cardiac monitor    Echo ISATU 05/02/2025  EF 55-60% right atrium normal size normal systolic and diastolic  function      Assessment and Plan     Problem List Items Addressed This Visit          Psychiatric    Anxiety and depression (Chronic)    Insomnia due to other mental disorder     Other Visit Diagnoses         Postural dizziness with presyncope    -  Primary    Relevant Orders    TSH (Completed)    T3    T4    Comprehensive metabolic panel (Completed)    BRODERICK Screen w/Reflex    Sedimentation rate (Completed)    C-Reactive Protein (Completed)    MRI Brain W WO Contrast    EEG,w/awake & drowsy record      Dizziness and giddiness        Relevant Orders    TSH (Completed)    T3    T4    Comprehensive metabolic panel (Completed)    BRODERICK Screen w/Reflex    Sedimentation rate (Completed)    C-Reactive Protein (Completed)    MRI Brain W WO Contrast    EEG,w/awake & drowsy record          Ms. Dunlap is a 29-year-old female new to me who presents for evaluation and recommendations of dizziness, presyncope episodes.  Patient has had chronic presyncopal episodes with dizziness presenting 8 months ago.  Symptoms were provoked by trauma to digit and chronic history of gastrointestinal diagnosis.  Patient's syncope is consistent with vasovagal episodes confirmed by cardiology workup with positive tilt-table results.  Patient is pending stress test all other cardiac testing unremarkable.  Patient has baseline tachycardia due to stimulant treatment of ADHD.  Cardiology recommended lowering or discontinuation of stimulant in which patient reports on today she has reduced to 3 times a week.  Patient also followed up with gastroenterology who placed referrals to Neurology and ENT.  Patient's saw ENT on today head hearing exam diagnosed with tonsil stones. Cardiology not suggestive of midodrine or fludrocortisone due to elevated blood pressure and heart rate patient will follow up with electrophysiology for 2nd opinion. Patient does have significant stress at work reported symptoms are exacerbated by anxiety. At today's visit patient  reports a constellation of symptoms.  To include dizziness, headaches, generalized pain, fatigue and overall diminishing quality of life.      Onset of dizziness occurred December of last year provoked in aggravated by gastrointestinal pain and a cut patient has sustained to her thumb.  Dizziness is positional sit-to-stand squat to stand bending causes a sense of disequilibrium that lasts for seconds or up to 1 minute.  Patient denies any involuntary muscle movements, noxious stimuli she states she can feel overall overheated but these symptoms do not occur associated with dizziness episodes. States has a history of migraines since age of 6 current headaches feels like head heaviness pressure with photophobia and some pain in her jaw.  Patient was previously on sumatriptan she did not like side effects of drowsiness therefore she self-discontinued and takes ibuprofen over-the-counter as needed.  Patient also reports she can sleep headache off.  Patient reports fall with trauma to her head January of last year did not receive any emergency or urgent care.  Followed up with the PCP cervical spine imaging unremarkable. Patient reports she was diagnosed with fibromyalgia several years ago and has been tried on multiple medications for pain these medications have not been effective in symptom management.  Patient is anxious and emotionally tearful throughout visit.  Physical examination unremarkable neurologically intact.    For patient's symptoms clear etiology for dizziness/syncope as vasovagal episodes.  In regards to patient's reporting of other constellation of symptoms high suspicion chronic migraines unclear etiology of widespread pain and fatigue patient confirms a chronic diagnosis of fibromyalgia.  Nevertheless she is requesting further testing.  For continued neurologic workup placed orders for MRI with and without contrast, EEG, metabolic/autoimmune/inflammatory labs.  Discussed with the patient home care for  vasovagal episodes provided printed handout for management of symptoms conservatively.  Reinforced we will was previously discussed with cardiology of counter pressure maneuvers, proper hydration, core strengthening exercises and compression stockings.  Discussed with the patient's signs and symptoms requiring her to report to emergency room activation of the EMS system patient verbalized understanding.  Physical examination unremarkable patient is neurologically intact discuss with the patient this at length as this is reassuring.    Continue home medications/regime and follow up with PCP for surveillance and long-term management of anxiety and depression    Sleep Hygiene: Poor sleep has a negative effect on cognition and mood. Several strategies have been shown to improve sleep:   Caffeine intake in the afternoon and evening, as well as stuffing oneself at supper, can decrease the quality of restful sleep throughout the night.   Bedtime and wake-up times should be consistent every night and morning so the body becomes used to a single routine, even on the weekends.  Engage in daily physical activity, but not 2-3 hours before bedtime.   No technology use (television, computer, iPad) 1-2 hours before bed.   Have a wind down routine (e.g., soft lights in the house, bath before bed, reduced fluid intake, songs, reading, less noise) to promote sleep readiness.   Visit the www.sleepfoundation.org for more strategies.    Questions and concerns were sought and answered to the patient's stated verbal satisfaction. The patient verbalizes understanding and agreement with the above stated treatment plan.      Thank you for allowing me to assist in Ms. Georgie Dunlap 's care. If you have any questions, please contact clinic @ [phone] or use of portal messaging services via electronic medical record.    MONICA Rachel  [facility]  Department of Neurology- [facility]      [phone]    Follow-up: Follow up in about 8 weeks (around 9/5/2025).  MRI imaging, EEG and lab results    Time spent on this encounter: 85 minutes. This includes face to face time and non-face to face time preparing to see the patient (eg, review of tests), obtaining and/or reviewing separately obtained history, documenting clinical information in the electronic or other health record, independently interpreting results and communicating results to the patient/family/caregiver, or care coordinator.     This note was created by combination of typed  and M-Modal dictation. Transcription and phonetic errors may be present.  If there are any questions, please contact me.         [1]   Current Outpatient Medications   Medication Sig Dispense Refill    acetaminophen (TYLENOL) 325 MG tablet Take 650 mg by mouth every 6 (six) hours as needed for Pain.      albuterol (PROVENTIL/VENTOLIN HFA) 90 mcg/actuation inhaler INHALE TWO PUFFS BY MOUTH EVERY SIX HOURS AS NEEDED FOR WHEEZING. 18 g 3    dextroamphetamine-amphetamine (ADDERALL) 20 mg tablet Take 30 mg by mouth 3 (three) times daily.      drospirenone, contraceptive, (SLYND) 4 mg (28) Tab Take 1 tablet (4 mg total) by mouth once daily. 28 tablet 11    fluticasone-salmeterol diskus inhaler 250-50 mcg Inhale 1 puff into the lungs 2 (two) times daily. Controller 60 each 3    mupirocin (BACTROBAN) 2 % ointment Apply topically 3 (three) times daily. 30 g 0    ondansetron (ZOFRAN-ODT) 4 MG TbDL Take 1 tablet (4 mg total) by mouth every 12 (twelve) hours as needed (nuasea). 30 tablet 2    pantoprazole (PROTONIX) 40 MG tablet Take 1 tablet (40 mg total) by mouth before breakfast. 90 tablet 3    polyethylene glycol (GLYCOLAX) 17 gram/dose powder Take 17 g by mouth once daily. Mix with 8 oz of water/liquid. 510 g 2    promethazine (PHENERGAN) 25 MG suppository 30      meclizine (ANTIVERT) 25 mg tablet Take 1 tablet (25 mg total) by mouth 3 (three) times daily as  needed for Dizziness. 30 tablet 0     No current facility-administered medications for this visit.   [2]   Social History  Socioeconomic History    Marital status:    Tobacco Use    Smoking status: Never    Smokeless tobacco: Never   Substance and Sexual Activity    Alcohol use: Never    Drug use: Yes     Frequency: 2.0 times per week     Types: Marijuana    Sexual activity: Yes     Partners: Male     Birth control/protection: None     Comment: marrued     Social Drivers of Health     Financial Resource Strain: Patient Declined (4/17/2025)    Overall Financial Resource Strain (CARDIA)     Difficulty of Paying Living Expenses: Patient declined   Food Insecurity: Patient Declined (4/17/2025)    Hunger Vital Sign     Worried About Running Out of Food in the Last Year: Patient declined     Ran Out of Food in the Last Year: Patient declined   Transportation Needs: Patient Declined (4/17/2025)    PRAPARE - Transportation     Lack of Transportation (Medical): Patient declined     Lack of Transportation (Non-Medical): Patient declined   Physical Activity: Inactive (4/17/2025)    Exercise Vital Sign     Days of Exercise per Week: 0 days     Minutes of Exercise per Session: 0 min   Stress: Stress Concern Present (4/17/2025)    [employer] [place] [employer] - Occupational Stress Questionnaire     Feeling of Stress : Very much   Housing Stability: Patient Declined (4/17/2025)    Housing Stability Vital Sign     Unable to Pay for Housing in the Last Year: Patient declined     Number of Times Moved in the Last Year: 0     Homeless in the Last Year: Patient declined

## 2025-07-12 LAB
T3FREE SERPL-MCNC: 100 NG/DL (ref 60–180)
T4 SERPL-MCNC: 10.2 UG/DL (ref 4.5–11.5)

## 2025-07-14 LAB — ANA (OHS): NORMAL

## 2025-07-30 ENCOUNTER — PATIENT MESSAGE (OUTPATIENT)
Dept: RADIOLOGY | Facility: HOSPITAL | Age: 30
End: 2025-07-30
Payer: COMMERCIAL

## 2025-07-31 ENCOUNTER — HOSPITAL ENCOUNTER (OUTPATIENT)
Dept: RADIOLOGY | Facility: HOSPITAL | Age: 30
Discharge: HOME | End: 2025-07-31
Payer: COMMERCIAL

## 2025-07-31 ENCOUNTER — TELEPHONE (OUTPATIENT)
Dept: NEUROLOGY | Facility: CLINIC | Age: 30
End: 2025-07-31
Payer: COMMERCIAL

## 2025-07-31 DIAGNOSIS — R42 POSTURAL DIZZINESS WITH PRESYNCOPE: ICD-10-CM

## 2025-07-31 DIAGNOSIS — R42 DIZZINESS AND GIDDINESS: ICD-10-CM

## 2025-07-31 DIAGNOSIS — R55 POSTURAL DIZZINESS WITH PRESYNCOPE: ICD-10-CM

## 2025-07-31 PROCEDURE — 70553 MRI BRAIN STEM W/O & W/DYE: CPT | Mod: TC,PO

## 2025-07-31 PROCEDURE — 25500020 PHARM REV CODE 255: Mod: PO

## 2025-07-31 PROCEDURE — 70553 MRI BRAIN STEM W/O & W/DYE: CPT | Mod: 26,,, | Performed by: RADIOLOGY

## 2025-07-31 PROCEDURE — A9585 GADOBUTROL INJECTION: HCPCS | Mod: PO

## 2025-07-31 RX ORDER — GADOBUTROL 604.72 MG/ML
10 INJECTION INTRAVENOUS
Status: COMPLETED | OUTPATIENT
Start: 2025-07-31 | End: 2025-07-31

## 2025-07-31 RX ADMIN — GADOBUTROL 10 ML: 604.72 INJECTION INTRAVENOUS at 11:07

## 2025-07-31 NOTE — TELEPHONE ENCOUNTER
I attempted to contact patient to let her know that Ms. Stark don't have any sooner appointments and that I will be sending a message over to the provider no answer lvm

## 2025-08-08 ENCOUNTER — HOSPITAL ENCOUNTER (OUTPATIENT)
Dept: NEUROLOGY | Facility: HOSPITAL | Age: 30
Discharge: HOME | End: 2025-08-08
Payer: COMMERCIAL

## 2025-08-08 DIAGNOSIS — R42 DIZZINESS AND GIDDINESS: ICD-10-CM

## 2025-08-08 DIAGNOSIS — R55 POSTURAL DIZZINESS WITH PRESYNCOPE: ICD-10-CM

## 2025-08-08 DIAGNOSIS — R42 POSTURAL DIZZINESS WITH PRESYNCOPE: ICD-10-CM

## 2025-08-08 PROCEDURE — 95816 EEG AWAKE AND DROWSY: CPT

## 2025-08-08 NOTE — PROCEDURES
ELECTROENCEPHALOGRAM REPORT    DATE OF SERVICE: 08/08/2025  EEG NUMBER: [id number]   REQUESTED BY: Mi Schrader NP  LOCATION OF SERVICE: [facility]    METHODOLOGY   Electroencephalographic (EEG) recording is with electrodes placed according to the International 10-20 placement system.  Thirty two (32) channels of digital signal (sampling rate of 512/sec) including T1 and T2 was simultaneously recorded from the scalp and may include  EKG, EMG, and/or eye monitors.  Recording band pass was 0.1 to 512 hz.  Digital video recording of the patient is simultaneously recorded with the EEG.  The patient is instructed report clinical symptoms which may occur during the recording session.  EEG and video recording is stored and archived in digital format. Activation procedures which include photic stimulation, hyperventilation and instructing patients to perform simple task are done in selected patients.    The EEG is displayed on a monitor screen and can be reviewed using different montages.  Computer assisted analysis is employed to detect spike and electrographic seizure activity.   The entire record is submitted for computer analysis.  The entire recording is visually reviewed and the times identified by computer analysis as being spikes or seizures are reviewed again.  Compresses spectral analysis (CSA) is also performed on the activity recorded from each individual channel.  This is displayed as a power display of frequencies from 0 to 30 Hz over time.   The CSA is reviewed looking for asymmetries in power between homologous areas of the scalp and then compared with the original EEG recording.     [employer] software was also utilized in the review of this study.  This software suite analyzes the EEG recording in multiple domains.  Coherence and rhythmicity is computed to identify EEG sections which may contain organized seizures.  Each channel undergoes analysis to detect presence of spike and sharp waves which have special  and morphological characteristic of epileptic activity.  The routine EEG recording is converted from spacial into frequency domain.  This is then displayed comparing homologous areas to identify areas of significant asymmetry.  Algorithm to identify non-cortically generated artifact is used to separate eye movement, EMG and other artifact from the EEG    EEG FINDINGS  During the maximally alert state, a 9-10 Hz posterior dominant rhythm was seen which was symmetrical, well-regulated and briskly attenuated to eye opening. In the more anterior head regions, symmetric frontocentral beta frequencies predominated. As drowsiness occurred, the posterior dominant rhythm attenuated, slow rolling eye movements appeared, and symmetrical vertex sharp transients were seen.  Sleep was not captured during the study.    No epileptiform findings were noted, no electrographic seizures were seen, and no clinical events were reported.      Activation Procedures   Hyperventilation - was not performed   Photic Stimulation     - occipital driving - absent    - Pathological discharges produced - NO      IMPRESSION:  Normal awake and drowsy    CLINICAL CORRELATION:  This is a normal EEG in awake and drowsy states.  There were no epileptiform findings, electrographic seizures, or no clinical events recorded. An EEG without epileptiform findings does not exclude the possibility of epilepsy. If the clinical suspicion of epilepsy is high, a repeat EEG after sleep depreivation, following a seizure, or a prolonged EEG may increase the frequency of detecting epileptiform discharges.      Cristi Gaytan MD  Neurology  [facility]

## 2025-08-08 NOTE — ADDENDUM NOTE
Encounter addended by: Lorelei Castro on: 8/8/2025 12:27 PM   Actions taken: Problem List modified, Charge Capture section accepted

## 2025-09-04 ENCOUNTER — OFFICE VISIT (OUTPATIENT)
Dept: NEUROLOGY | Facility: CLINIC | Age: 30
End: 2025-09-04
Payer: COMMERCIAL

## 2025-09-04 VITALS — WEIGHT: 239 LBS | SYSTOLIC BLOOD PRESSURE: 137 MMHG | DIASTOLIC BLOOD PRESSURE: 73 MMHG | BODY MASS INDEX: 39.77 KG/M2

## 2025-09-04 DIAGNOSIS — E66.812 CLASS 2 OBESITY: ICD-10-CM

## 2025-09-04 DIAGNOSIS — R55 POSTURAL DIZZINESS WITH PRESYNCOPE: Primary | ICD-10-CM

## 2025-09-04 DIAGNOSIS — R42 POSTURAL DIZZINESS WITH PRESYNCOPE: Primary | ICD-10-CM

## 2025-09-04 DIAGNOSIS — F32.A ANXIETY AND DEPRESSION: ICD-10-CM

## 2025-09-04 DIAGNOSIS — F41.9 ANXIETY AND DEPRESSION: ICD-10-CM

## 2025-09-04 PROCEDURE — 3075F SYST BP GE 130 - 139MM HG: CPT | Mod: CPTII,S$GLB,,

## 2025-09-04 PROCEDURE — 3008F BODY MASS INDEX DOCD: CPT | Mod: CPTII,S$GLB,,

## 2025-09-04 PROCEDURE — 99999 PR PBB SHADOW E&M-EST. PATIENT-LVL III: CPT | Mod: PBBFAC,,,

## 2025-09-04 PROCEDURE — 99214 OFFICE O/P EST MOD 30 MIN: CPT | Mod: S$GLB,,,

## 2025-09-04 PROCEDURE — 1159F MED LIST DOCD IN RCRD: CPT | Mod: CPTII,S$GLB,,

## 2025-09-04 PROCEDURE — 3078F DIAST BP <80 MM HG: CPT | Mod: CPTII,S$GLB,,
